# Patient Record
Sex: MALE | Race: WHITE | NOT HISPANIC OR LATINO
[De-identification: names, ages, dates, MRNs, and addresses within clinical notes are randomized per-mention and may not be internally consistent; named-entity substitution may affect disease eponyms.]

---

## 2018-02-13 ENCOUNTER — TRANSCRIPTION ENCOUNTER (OUTPATIENT)
Age: 63
End: 2018-02-13

## 2021-10-10 ENCOUNTER — TRANSCRIPTION ENCOUNTER (OUTPATIENT)
Age: 66
End: 2021-10-10

## 2021-10-25 ENCOUNTER — TRANSCRIPTION ENCOUNTER (OUTPATIENT)
Age: 66
End: 2021-10-25

## 2022-04-01 ENCOUNTER — TRANSCRIPTION ENCOUNTER (OUTPATIENT)
Age: 67
End: 2022-04-01

## 2022-06-09 ENCOUNTER — NON-APPOINTMENT (OUTPATIENT)
Age: 67
End: 2022-06-09

## 2022-06-23 ENCOUNTER — NON-APPOINTMENT (OUTPATIENT)
Age: 67
End: 2022-06-23

## 2023-03-16 ENCOUNTER — NON-APPOINTMENT (OUTPATIENT)
Age: 68
End: 2023-03-16

## 2023-04-12 ENCOUNTER — NON-APPOINTMENT (OUTPATIENT)
Age: 68
End: 2023-04-12

## 2024-03-19 ENCOUNTER — APPOINTMENT (OUTPATIENT)
Dept: UROLOGY | Facility: CLINIC | Age: 69
End: 2024-03-19
Payer: MEDICARE

## 2024-03-19 ENCOUNTER — OUTPATIENT (OUTPATIENT)
Dept: OUTPATIENT SERVICES | Facility: HOSPITAL | Age: 69
LOS: 1 days | End: 2024-03-19
Payer: MEDICARE

## 2024-03-19 VITALS
HEIGHT: 71 IN | HEART RATE: 71 BPM | TEMPERATURE: 97.2 F | DIASTOLIC BLOOD PRESSURE: 65 MMHG | BODY MASS INDEX: 24.5 KG/M2 | SYSTOLIC BLOOD PRESSURE: 162 MMHG | RESPIRATION RATE: 16 BRPM | WEIGHT: 175 LBS

## 2024-03-19 DIAGNOSIS — Z86.39 PERSONAL HISTORY OF OTHER ENDOCRINE, NUTRITIONAL AND METABOLIC DISEASE: ICD-10-CM

## 2024-03-19 DIAGNOSIS — Z86.79 PERSONAL HISTORY OF OTHER DISEASES OF THE CIRCULATORY SYSTEM: ICD-10-CM

## 2024-03-19 DIAGNOSIS — Z87.09 PERSONAL HISTORY OF OTHER DISEASES OF THE RESPIRATORY SYSTEM: ICD-10-CM

## 2024-03-19 DIAGNOSIS — R31.0 GROSS HEMATURIA: ICD-10-CM

## 2024-03-19 PROCEDURE — 52000 CYSTOURETHROSCOPY: CPT

## 2024-03-19 PROCEDURE — 99204 OFFICE O/P NEW MOD 45 MIN: CPT | Mod: 25

## 2024-03-19 NOTE — PHYSICAL EXAM
[Normal Appearance] : normal appearance [General Appearance - In No Acute Distress] : no acute distress [Edema] : no peripheral edema [] : no respiratory distress [Abdomen Soft] : soft [Abdomen Tenderness] : non-tender [Normal Station and Gait] : the gait and station were normal for the patient's age [No Focal Deficits] : no focal deficits [Skin Color & Pigmentation] : normal skin color and pigmentation [Oriented To Time, Place, And Person] : oriented to person, place, and time

## 2024-03-20 LAB
APPEARANCE: CLEAR
BACTERIA UR CULT: NORMAL
BACTERIA: NEGATIVE /HPF
BILIRUBIN URINE: NEGATIVE
BLOOD URINE: NEGATIVE
CAST: 0 /LPF
COLOR: YELLOW
EPITHELIAL CELLS: 0 /HPF
GLUCOSE QUALITATIVE U: 500 MG/DL
KETONES URINE: NEGATIVE MG/DL
LEUKOCYTE ESTERASE URINE: NEGATIVE
MICROSCOPIC-UA: NORMAL
NITRITE URINE: NEGATIVE
PH URINE: 6.5
PROTEIN URINE: NEGATIVE MG/DL
RED BLOOD CELLS URINE: 0 /HPF
SPECIFIC GRAVITY URINE: 1.01
UROBILINOGEN URINE: 0.2 MG/DL
WHITE BLOOD CELLS URINE: 0 /HPF

## 2024-03-20 NOTE — HISTORY OF PRESENT ILLNESS
[Urinary Frequency] : urinary frequency [Nocturia] : nocturia [Hematuria - Gross] : gross hematuria [None] : None [FreeTextEntry1] : Pt is a 67 y/o M with PMH of T2DM (HbA1c ~10 in 12/23), COPD, CAD s/p angioplasty and stents, 60 pack-year smoking hx (quit in 1995), presenting for evaluation of bladder tumor.  Pt was hospitalized in January while on vacation in [place] following an episode of syncope. Padilla catheter was placed in the hospital and subsequently had gross hematuria x4d.  US showed 1.2 cm vascularized polyp in the left bladder wall.  Denies hx of gross or microscopic hematuria prior to recent hospitalization. No active hematuria. Endorses urinary frequency and nocturia x2 every night.  no prior bladder tumors   Cystoscopy today (3/19/24): trabeculated bladder, left bladder wall tumor  called his cardiologist - at balloon angioplasty with drug eluting balloon - not approved in [place]. unclear when safe donna stop AC - 3 or 6 months  [Urinary Incontinence] : no urinary incontinence [Abdominal Pain] : no abdominal pain [Flank Pain] : no flank pain

## 2024-03-21 DIAGNOSIS — R35.0 FREQUENCY OF MICTURITION: ICD-10-CM

## 2024-03-21 DIAGNOSIS — D49.4 NEOPLASM OF UNSPECIFIED BEHAVIOR OF BLADDER: ICD-10-CM

## 2024-03-21 DIAGNOSIS — R31.0 GROSS HEMATURIA: ICD-10-CM

## 2024-03-21 LAB — URINE CYTOLOGY: NORMAL

## 2024-03-25 ENCOUNTER — APPOINTMENT (OUTPATIENT)
Dept: ENDOCRINOLOGY | Facility: CLINIC | Age: 69
End: 2024-03-25
Payer: MEDICARE

## 2024-03-25 VITALS
BODY MASS INDEX: 24.36 KG/M2 | WEIGHT: 174 LBS | HEIGHT: 71 IN | HEART RATE: 69 BPM | DIASTOLIC BLOOD PRESSURE: 74 MMHG | SYSTOLIC BLOOD PRESSURE: 146 MMHG | OXYGEN SATURATION: 98 %

## 2024-03-25 DIAGNOSIS — Z87.891 PERSONAL HISTORY OF NICOTINE DEPENDENCE: ICD-10-CM

## 2024-03-25 DIAGNOSIS — Z83.3 FAMILY HISTORY OF DIABETES MELLITUS: ICD-10-CM

## 2024-03-25 DIAGNOSIS — J44.9 CHRONIC OBSTRUCTIVE PULMONARY DISEASE, UNSPECIFIED: ICD-10-CM

## 2024-03-25 LAB
GLUCOSE BLDC GLUCOMTR-MCNC: 225
HBA1C MFR BLD HPLC: 8.8

## 2024-03-25 PROCEDURE — 83036 HEMOGLOBIN GLYCOSYLATED A1C: CPT | Mod: QW

## 2024-03-25 PROCEDURE — 82962 GLUCOSE BLOOD TEST: CPT

## 2024-03-25 PROCEDURE — 99204 OFFICE O/P NEW MOD 45 MIN: CPT

## 2024-03-25 RX ORDER — ENALAPRIL MALEATE 5 MG/1
5 TABLET ORAL DAILY
Qty: 90 | Refills: 0 | Status: ACTIVE | COMMUNITY
Start: 2024-03-25

## 2024-03-25 RX ORDER — SITAGLIPTIN 100 MG/1
100 TABLET, FILM COATED ORAL DAILY
Qty: 1 | Refills: 3 | Status: ACTIVE | COMMUNITY
Start: 2024-03-25

## 2024-03-25 RX ORDER — ATORVASTATIN CALCIUM 40 MG/1
40 TABLET, FILM COATED ORAL
Qty: 1 | Refills: 3 | Status: ACTIVE | COMMUNITY
Start: 2024-03-25

## 2024-03-25 RX ORDER — EZETIMIBE 10 MG/1
10 TABLET ORAL DAILY
Qty: 30 | Refills: 3 | Status: ACTIVE | COMMUNITY
Start: 2024-03-25

## 2024-03-25 NOTE — ASSESSMENT
[FreeTextEntry1] : The patient is a 68 year old male being seen in the office today for evaluation of diabetes. Past medical hx of T2DM (HbA1c ~10 in 12/23), HTN, CAD s/p angioplasty and stents, COPD, 60 pack-year smoking hx (quit in 1995), and bladder tumor. Admitted to hospital in [place] 1/2024 for syncope, had angiogram performed, s/p balloon angioplasty in [place]. Now following cardiology here, Dr. Keith. Recently found to have bladder tumor, following urology (Dr. Salomon). Pending possible TURBT.   T2DM - A1c 8.8% today 325/2024 - Current regimen: Metformin 850mg BID and Januvia 100mg daily - Plan: will obtain labs today, if GFR stable will recommend increasing Metformin to 1000mg BID. Discussed possibly switching Januvia to GLP1, patient would like to avoid injectables at this time. Can continue Januvia 100mg daily, no hx of pancreatitis. Following urology for bladder tumor, pending possible TURBT 4/2024, reports some polyuria, discussed possibly starting SGLT2i post urological procedure. Discussed risks vs benefits. No hx of DKA. No hx of frequent UTI's. Patient to check BG more frequently recommend to check 2-3 times a day staggered manner. Alternatively, can consider starting Prandin if hyperglycemia is primarily post prandial. Recommend professional CGM, patient going on vacation next week, will come back post vacation for professional winston placement with RN. Will make 4-6 week follow up appointment to review. - Labs: will obtain CMP, urine ACR, Vitamin B12 - Preventive:   Retinopathy screening: + b/l retinopathy, following optho regularly   Foot exam/podiatrist: recommend podiatry evaluation  - Counseling: We discussed diabetes foot care, long term complications of diabetes including but not limited to neuropathy, nephropathy, retinopathy and cardiovascular disease and the benefits of good glycemic control in preventing said complications. We discussed the risks and benefits of diabetes medications and/or insulin as relevant for today, prevention and management of hypoglycemia, importance of medication compliance and blood glucose monitoring. - Discussed diabetic diet, decreased intake of simple/processed sugars, increase intake of whole foods with protein and fiber. Increase physical activity as tolerated with cardiovascular exercise 150 min/per week consistently and resistance exercise three days per week.   HLD:  - Atorvastatin 40mg daily - Ezetimibe 10mg daily - Following cardiologist Dr. Keith - Check lipid profile   HTN: - Metoprolol 50mg daily - Enalapril 5mg a day - BP elevated today 146/74, repeat at next visit, following cardiology, recently had holter monitor and stress test - Recommend follow up with cardiology   4 week follow up with me Recommend CDE evaluation Has appointment with Dr. Graham in July

## 2024-03-25 NOTE — HISTORY OF PRESENT ILLNESS
[FreeTextEntry1] : The patient is a 68 year old male being seen in the office today for evaluation of diabetes. Past medical hx of T2DM (HbA1c ~10 in 12/23), HTN, CAD s/p angioplasty and stents, COPD, 60 pack-year smoking hx (quit in 1995), and bladder tumor. Admitted to hospital in [place] 1/2024 for syncope, had angiogram performed, s/p balloon angioplasty in [place]. Now following cardiology here, Dr. Keith. Recently found to have bladder tumor, following urology (Dr. Salomon). Pending possible TURBT.   REFERRED BY: Dr. George Lombardi, PCP. Plans to switch to new PCP in September: Dr. Causey   DIABETES HPI: Type of Diabetes: T2DM  Duration of Diabetes: dx in 2003 A1c: reports HbA1c 10% in 12/23 Today 8.8%  Current home regimen: Metformin 850mg BID and Januvia 100mg daily, restarted 2/2024 (was off of DM medications for about 1 month after discharge from hospital in [place], restarted by PCP) Past medications for diabetes: denies  Diabetes complications: Microvascular: [+] neuropathy, b/l feet with numbness, [-] nephropathy, [+] retinopathy Macrovascular: [+] CAD, [-] CVA, [-] PAD History of DKA/hospitalizations due to Diabetes: denies  Last retinopathy screening: last seen by optho 1 month ago, hx of retinopathy, s/p retina surgery for hemorrhage 2/2022, currently getting laser treatment for retinopathy in b/l eyes Last nephropathy screening: no urine ACR on file Last foot exam/podiatry visit: reports dry skin on b/l feet, does not see podiatry currently   BG self monitoring: checking fingersticks 1x a day, BG ranging from 130-200's, lowest 's Hypoglycemia events: denies  Diet: 3 meals a day, eating healthier, reduced processed food intake, eating more salads and veggies Breakfast: sausage and eggs, keto bread and butter. Cranberry juice Lunch: Hamburger jimbo or sandwich, tuna fish or turkey Dinner: meat, occasional potatoes and pasta, Friday's will have pizza Snack: sugar free cookies, sugar free jello with sugar Drinks diet soda and water  Exercise: walks daily Steroid intake: no PO steroids, daily steroid inhalers for COPD  Family history of diabetes: brother and maternal uncle with diabetes Social history: retired, lives with wife   Chronic blurry vision from cataract and retinopathy, denies polydypsia Reports some polyuria, seeing urologist for bladder tumor   Comorbidities: HTN, HLD  HLD:  Atorvastatin 40mg daily Ezetimibe 10mg daily - Following cardiologist Dr. Keith  HTN: Metoprolol 50mg daily Enalapril 5mg a day  PCP: Dr. Lombardi phone: [phone] fax: [phone] Cardiologist: Dr. Keith phone: [phone] fax: [phone]

## 2024-03-25 NOTE — REVIEW OF SYSTEMS
[Blurred Vision] : blurred vision [Polyuria] : polyuria [As Noted in HPI] : as noted in HPI [Pain/Numbness of Digits] : pain/numbness of digits [Negative] : Constitutional [Eye Pain] : no pain [Dysphagia] : no dysphagia [Neck Pain] : no neck pain [Dysphonia] : no dysphonia [Chest Pain] : no chest pain [Palpitations] : no palpitations [Shortness Of Breath] : no shortness of breath [Abdominal Pain] : no abdominal pain [Polydipsia] : no polydipsia

## 2024-03-25 NOTE — PHYSICAL EXAM
[Alert] : alert [No Acute Distress] : no acute distress [Well Developed] : well developed [Normal Sclera/Conjunctiva] : normal sclera/conjunctiva [No Proptosis] : no proptosis [No Respiratory Distress] : no respiratory distress [No Accessory Muscle Use] : no accessory muscle use [Normal Rate and Effort] : normal respiratory rate and effort [Normal Gait] : normal gait [No Involuntary Movements] : no involuntary movements were seen [Oriented x3] : oriented to person, place, and time [Normal Affect] : the affect was normal [Swelling] : not swollen [Tenderness] : not tender [Erythema] : not erythematous [#1 Diminished] : number 1 was normal [#2 Diminished] : number 2 was normal [#3 Diminished] : number 3 was normal [de-identified] : b/l feet dry, + callus

## 2024-03-27 LAB
ALBUMIN SERPL ELPH-MCNC: 4.5 G/DL
ALP BLD-CCNC: 84 U/L
ALT SERPL-CCNC: 31 U/L
ANION GAP SERPL CALC-SCNC: 12 MMOL/L
AST SERPL-CCNC: 25 U/L
BILIRUB SERPL-MCNC: 0.8 MG/DL
BUN SERPL-MCNC: 19 MG/DL
C PEPTIDE SERPL-MCNC: 1.3 NG/ML
CALCIUM SERPL-MCNC: 10.1 MG/DL
CHLORIDE SERPL-SCNC: 105 MMOL/L
CHOLEST SERPL-MCNC: 97 MG/DL
CO2 SERPL-SCNC: 26 MMOL/L
CREAT SERPL-MCNC: 1.01 MG/DL
CREAT SPEC-SCNC: 45 MG/DL
EGFR: 80 ML/MIN/1.73M2
GLUCOSE SERPL-MCNC: 239 MG/DL
HDLC SERPL-MCNC: 47 MG/DL
LDLC SERPL CALC-MCNC: 37 MG/DL
MICROALBUMIN 24H UR DL<=1MG/L-MCNC: <1.2 MG/DL
MICROALBUMIN/CREAT 24H UR-RTO: NORMAL MG/G
NONHDLC SERPL-MCNC: 50 MG/DL
POTASSIUM SERPL-SCNC: 5 MMOL/L
PROT SERPL-MCNC: 7.1 G/DL
SODIUM SERPL-SCNC: 143 MMOL/L
TRIGL SERPL-MCNC: 56 MG/DL
VIT B12 SERPL-MCNC: 1352 PG/ML

## 2024-03-27 RX ORDER — ALCOHOL ANTISEPTIC PADS
70 PADS, MEDICATED (EA) TOPICAL
Qty: 1 | Refills: 2 | Status: ACTIVE | COMMUNITY
Start: 2024-03-25 | End: 1900-01-01

## 2024-03-27 RX ORDER — METFORMIN HYDROCHLORIDE 1000 MG/1
1000 TABLET, COATED ORAL
Qty: 180 | Refills: 0 | Status: ACTIVE | COMMUNITY
Start: 2024-03-25 | End: 1900-01-01

## 2024-03-27 RX ORDER — LANCETS 28 GAUGE
EACH MISCELLANEOUS
Qty: 1 | Refills: 3 | Status: ACTIVE | COMMUNITY
Start: 2024-03-25 | End: 1900-01-01

## 2024-04-12 ENCOUNTER — APPOINTMENT (OUTPATIENT)
Dept: ENDOCRINOLOGY | Facility: CLINIC | Age: 69
End: 2024-04-12
Payer: MEDICARE

## 2024-04-12 PROCEDURE — G0108 DIAB MANAGE TRN  PER INDIV: CPT

## 2024-04-15 ENCOUNTER — APPOINTMENT (OUTPATIENT)
Dept: UROLOGY | Facility: AMBULATORY SURGERY CENTER | Age: 69
End: 2024-04-15

## 2024-04-16 ENCOUNTER — OUTPATIENT (OUTPATIENT)
Dept: OUTPATIENT SERVICES | Facility: HOSPITAL | Age: 69
LOS: 1 days | End: 2024-04-16

## 2024-04-16 VITALS
DIASTOLIC BLOOD PRESSURE: 68 MMHG | HEART RATE: 70 BPM | SYSTOLIC BLOOD PRESSURE: 150 MMHG | HEIGHT: 71 IN | WEIGHT: 167.99 LBS | TEMPERATURE: 97 F | RESPIRATION RATE: 17 BRPM | OXYGEN SATURATION: 97 %

## 2024-04-16 DIAGNOSIS — Z98.890 OTHER SPECIFIED POSTPROCEDURAL STATES: Chronic | ICD-10-CM

## 2024-04-16 DIAGNOSIS — D49.4 NEOPLASM OF UNSPECIFIED BEHAVIOR OF BLADDER: ICD-10-CM

## 2024-04-16 DIAGNOSIS — Z98.49 CATARACT EXTRACTION STATUS, UNSPECIFIED EYE: Chronic | ICD-10-CM

## 2024-04-16 DIAGNOSIS — D41.4 NEOPLASM OF UNCERTAIN BEHAVIOR OF BLADDER: ICD-10-CM

## 2024-04-16 DIAGNOSIS — Z95.5 PRESENCE OF CORONARY ANGIOPLASTY IMPLANT AND GRAFT: Chronic | ICD-10-CM

## 2024-04-16 DIAGNOSIS — I25.10 ATHEROSCLEROTIC HEART DISEASE OF NATIVE CORONARY ARTERY WITHOUT ANGINA PECTORIS: Chronic | ICD-10-CM

## 2024-04-16 DIAGNOSIS — J44.9 CHRONIC OBSTRUCTIVE PULMONARY DISEASE, UNSPECIFIED: ICD-10-CM

## 2024-04-16 DIAGNOSIS — I25.10 ATHEROSCLEROTIC HEART DISEASE OF NATIVE CORONARY ARTERY WITHOUT ANGINA PECTORIS: ICD-10-CM

## 2024-04-16 DIAGNOSIS — E11.9 TYPE 2 DIABETES MELLITUS WITHOUT COMPLICATIONS: ICD-10-CM

## 2024-04-16 DIAGNOSIS — Z98.61 CORONARY ANGIOPLASTY STATUS: Chronic | ICD-10-CM

## 2024-04-16 LAB
A1C WITH ESTIMATED AVERAGE GLUCOSE RESULT: 8.9 % — HIGH (ref 4–5.6)
ANION GAP SERPL CALC-SCNC: 12 MMOL/L — SIGNIFICANT CHANGE UP (ref 7–14)
BUN SERPL-MCNC: 19 MG/DL — SIGNIFICANT CHANGE UP (ref 7–23)
CALCIUM SERPL-MCNC: 9.5 MG/DL — SIGNIFICANT CHANGE UP (ref 8.4–10.5)
CHLORIDE SERPL-SCNC: 102 MMOL/L — SIGNIFICANT CHANGE UP (ref 98–107)
CO2 SERPL-SCNC: 24 MMOL/L — SIGNIFICANT CHANGE UP (ref 22–31)
CREAT SERPL-MCNC: 0.87 MG/DL — SIGNIFICANT CHANGE UP (ref 0.5–1.3)
EGFR: 93 ML/MIN/1.73M2 — SIGNIFICANT CHANGE UP
ESTIMATED AVERAGE GLUCOSE: 209 — SIGNIFICANT CHANGE UP
GLUCOSE SERPL-MCNC: 281 MG/DL — HIGH (ref 70–99)
HCT VFR BLD CALC: 37.1 % — LOW (ref 39–50)
HGB BLD-MCNC: 12.4 G/DL — LOW (ref 13–17)
MCHC RBC-ENTMCNC: 30.2 PG — SIGNIFICANT CHANGE UP (ref 27–34)
MCHC RBC-ENTMCNC: 33.4 GM/DL — SIGNIFICANT CHANGE UP (ref 32–36)
MCV RBC AUTO: 90.3 FL — SIGNIFICANT CHANGE UP (ref 80–100)
NRBC # BLD: 0 /100 WBCS — SIGNIFICANT CHANGE UP (ref 0–0)
NRBC # FLD: 0 K/UL — SIGNIFICANT CHANGE UP (ref 0–0)
PLATELET # BLD AUTO: 198 K/UL — SIGNIFICANT CHANGE UP (ref 150–400)
POTASSIUM SERPL-MCNC: 4.8 MMOL/L — SIGNIFICANT CHANGE UP (ref 3.5–5.3)
POTASSIUM SERPL-SCNC: 4.8 MMOL/L — SIGNIFICANT CHANGE UP (ref 3.5–5.3)
RBC # BLD: 4.11 M/UL — LOW (ref 4.2–5.8)
RBC # FLD: 12.6 % — SIGNIFICANT CHANGE UP (ref 10.3–14.5)
SODIUM SERPL-SCNC: 138 MMOL/L — SIGNIFICANT CHANGE UP (ref 135–145)
WBC # BLD: 8.02 K/UL — SIGNIFICANT CHANGE UP (ref 3.8–10.5)
WBC # FLD AUTO: 8.02 K/UL — SIGNIFICANT CHANGE UP (ref 3.8–10.5)

## 2024-04-16 RX ORDER — SODIUM CHLORIDE 9 MG/ML
3 INJECTION INTRAMUSCULAR; INTRAVENOUS; SUBCUTANEOUS EVERY 8 HOURS
Refills: 0 | Status: DISCONTINUED | OUTPATIENT
Start: 2024-04-25 | End: 2024-05-09

## 2024-04-16 RX ORDER — SODIUM CHLORIDE 9 MG/ML
1000 INJECTION, SOLUTION INTRAVENOUS
Refills: 0 | Status: DISCONTINUED | OUTPATIENT
Start: 2024-04-25 | End: 2024-05-09

## 2024-04-16 NOTE — H&P PST ADULT - OTHER CARE PROVIDERS
Cardiologist Dr. Rodri Keith [phone]     Endocrinologist Dr. April Lu  Pulmonologist Dr. Hall [phone] Cardiologist Dr. Rodri Keith [phone]     Endocrinologist Dr. April Lu    Pulmonologist Dr. Hall [phone]

## 2024-04-16 NOTE — H&P PST ADULT - NSICDXPASTSURGICALHX_GEN_ALL_CORE_FT
PAST SURGICAL HISTORY:  CAD S/P percutaneous coronary angioplasty     H/O arthroscopy of knee     H/O colonoscopy     H/O cystoscopy     H/O eye surgery     History of artificial skin graft     History of vocal cord polypectomy     S/P cataract surgery     S/P coronary artery stent placement     S/P right rotator cuff repair     Status post Mohs surgery

## 2024-04-16 NOTE — H&P PST ADULT - NSICDXPASTMEDICALHX_GEN_ALL_CORE_FT
PAST MEDICAL HISTORY:  Basal cell carcinoma     CAD (coronary artery disease)     COPD (chronic obstructive pulmonary disease)     H/O squamous cell carcinoma excision     History of shingles     HLD (hyperlipidemia)     HTN (hypertension)     Lumbar herniated disc     Neoplasm of uncertain behavior of bladder     Nerve pain     Neuroma     Pulmonary nodule     Stented coronary artery     Syncope     Type 2 diabetes mellitus

## 2024-04-16 NOTE — H&P PST ADULT - HISTORY OF PRESENT ILLNESS
67 y/o male with HTN, HLD, Type 2 DM, COPD and CAD s/p angioplasty and stent currently on Brilinta presents to [facility] preop for cystoscopy, transurethral resection of bladder tumor. Pt was hospitalized in January 2024 while on vacation in [place] following an episode of syncope. Padilla catheter was placed in the hospital and subsequently had gross hematuria for 4 days. An US showed 1.2 cm bladder polyp. s/p cystoscopy on 3/19/24 which confirmed left bladder wall tumor.     Pt s/p balloon angioplasty on 1/20/24 while in [place]. h/o drug eluting coronary stent placement in August 2007.  69 y/o male with HTN, HLD, Type 2 DM, COPD and CAD s/p angioplasty and stent currently on Brilinta presents to [facility] preop for cystoscopy, transurethral resection of bladder tumor. Pt was hospitalized in January 2024 while on vacation in [place] following an episode of syncope. A mont[place]ro catheter was placed in the hospital and pt subsequently had gross hematuria for 4 days. An US showed 1.2 cm bladder polyp. s/p cystoscopy on 3/19/24 which confirmed left bladder wall tumor.     Pt s/p balloon angioplasty on 1/20/24 while in [place]. h/o drug eluting coronary stent placement in August 2007.

## 2024-04-16 NOTE — H&P PST ADULT - CARDIOVASCULAR COMMENTS
CAD, HTN, HLD. Pt hospitalized in January 2024 while on vacation in [place] following an episode of syncope. s/p balloon angioplasty on 1/20/24 while in [place]. h/o drug eluting coronary stent placement in August 2007. Pt seen by primary cardiologist upon [place]. He wore a holter monitor x14 days. Pt reports cardiac etiology for syncope episode was ruled out after results of monitor.

## 2024-04-16 NOTE — H&P PST ADULT - NSICDXFAMILYHX_GEN_ALL_CORE_FT
FAMILY HISTORY:  Father  Still living? Unknown  FH: lung cancer, Age at diagnosis: Age Unknown    Mother  Still living? Unknown  FH: stroke, Age at diagnosis: Age Unknown

## 2024-04-16 NOTE — H&P PST ADULT - PROBLEM SELECTOR PLAN 3
metformin to be held AM of surgery  accu check to be assessed on admission metformin to be held AM of surgery  accu check to be assessed on admission  hga1c pending

## 2024-04-16 NOTE — H&P PST ADULT - PROBLEM SELECTOR PLAN 1
preop for cystoscopy, transurethral resection of bladder tumor on 4/22/24  preop instructions given, pt verbalized understanding  GI prophylaxis provided  cbc, bmp, hga1c pending

## 2024-04-16 NOTE — H&P PST ADULT - ASSESSMENT
69 y/o male with HTN, HLD, Type 2 DM, COPD and CAD s/p angioplasty and stent currently on Brilinta presents to [facility] preop for cystoscopy, transurethral resection of bladder tumor. Pt was hospitalized in January 2024 while on vacation in [place] following an episode of syncope. Padilla catheter was placed in the hospital and subsequently had gross hematuria for 4 days. An US showed 1.2 cm bladder polyp. s/p cystoscopy on 3/19/24 which confirmed left bladder wall tumor.     Pt s/p balloon angioplasty on 1/20/24 while in [place]. h/o drug eluting coronary stent placement in August 2007.  69 y/o male with HTN, HLD, Type 2 DM, COPD and CAD s/p angioplasty and stent currently on Brilinta presents to [facility] preop for cystoscopy, transurethral resection of bladder tumor. Pt was hospitalized in January 2024 while on vacation in [place] following an episode of syncope. A montenegro catheter was placed in the hospital and pt subsequently had gross hematuria for 4 days. An US showed 1.2 cm bladder polyp. s/p cystoscopy on 3/19/24 which confirmed left bladder wall tumor.     Pt s/p balloon angioplasty on 1/20/24 while in [place]. h/o drug eluting coronary stent placement in August 2007.

## 2024-04-16 NOTE — H&P PST ADULT - EKG AND INTERPRETATION
per wife, EKG done 1/30/24 per wife, EKG done 1/30/24- copy of report requested 1/30/24 Sinus Tachycardia, incomplete right bundle branch block. HR 100bpm

## 2024-04-16 NOTE — H&P PST ADULT - ENDOCRINE COMMENTS
Type 2 DM on Metformin. Pt has been non compliant with diabetes meds. Recently had doses of meds increased and as per wife, pt is now compliant with diet and  medication regimen.  Avg fasting BS 178mg/dl. Type 2 DM on Metformin. Pt has been non compliant with diabetes meds. Recently had doses of meds increased and as per wife, pt is now compliant with diet and  medication regimen.  Avg fasting BS 178mg/dl.  Hga1c in March was 8.9%

## 2024-04-16 NOTE — H&P PST ADULT - NEGATIVE NEUROLOGICAL SYMPTOMS
no transient paralysis/no weakness/no generalized seizures/no focal seizures/no vertigo/no headache/no loss of consciousness/no hemiparesis/no confusion

## 2024-04-16 NOTE — H&P PST ADULT - NEGATIVE ENMT SYMPTOMS
no ear pain/no tinnitus/no vertigo/no sinus symptoms/no nose bleeds/no gum bleeding/no dry mouth/no throat pain/no dysphagia

## 2024-04-16 NOTE — H&P PST ADULT - RESPIRATORY AND THORAX COMMENTS
COPD on Spiriva and Advair. Pt uses rescue inhaler as needed. Denies COPD exacerbation. Followed by Pulmonary 2x/ year

## 2024-04-16 NOTE — H&P PST ADULT - PROBLEM SELECTOR PLAN 2
pt was instructed by his cardiologist to hold Brilinta 3-5 days prior to surgery  He will remain on low dose Aspirin due to coronary stent pt was instructed by his cardiologist to hold Brilinta 3-5 days prior to surgery  He will remain on low dose Aspirin due to coronary stent  current EKG and ECHO report requested from Cardiologist pt was instructed by his cardiologist to hold Brilinta 3-5 days prior to surgery  He will remain on low dose Aspirin due to coronary stent  ECHO report requested from Cardiologist

## 2024-04-17 PROBLEM — R91.1 SOLITARY PULMONARY NODULE: Chronic | Status: ACTIVE | Noted: 2024-04-16

## 2024-04-17 PROBLEM — E78.5 HYPERLIPIDEMIA, UNSPECIFIED: Chronic | Status: ACTIVE | Noted: 2024-04-16

## 2024-04-17 PROBLEM — D41.4 NEOPLASM OF UNCERTAIN BEHAVIOR OF BLADDER: Chronic | Status: ACTIVE | Noted: 2024-04-16

## 2024-04-17 PROBLEM — R55 SYNCOPE AND COLLAPSE: Chronic | Status: ACTIVE | Noted: 2024-04-16

## 2024-04-17 PROBLEM — Z86.19 PERSONAL HISTORY OF OTHER INFECTIOUS AND PARASITIC DISEASES: Chronic | Status: ACTIVE | Noted: 2024-04-16

## 2024-04-17 PROBLEM — Z98.890 OTHER SPECIFIED POSTPROCEDURAL STATES: Chronic | Status: ACTIVE | Noted: 2024-04-16

## 2024-04-17 PROBLEM — M79.2 NEURALGIA AND NEURITIS, UNSPECIFIED: Chronic | Status: ACTIVE | Noted: 2024-04-16

## 2024-04-17 PROBLEM — M51.26 OTHER INTERVERTEBRAL DISC DISPLACEMENT, LUMBAR REGION: Chronic | Status: ACTIVE | Noted: 2024-04-16

## 2024-04-17 PROBLEM — Z95.5 PRESENCE OF CORONARY ANGIOPLASTY IMPLANT AND GRAFT: Chronic | Status: ACTIVE | Noted: 2024-04-16

## 2024-04-17 PROBLEM — I25.10 ATHEROSCLEROTIC HEART DISEASE OF NATIVE CORONARY ARTERY WITHOUT ANGINA PECTORIS: Chronic | Status: ACTIVE | Noted: 2024-04-16

## 2024-04-17 PROBLEM — I10 ESSENTIAL (PRIMARY) HYPERTENSION: Chronic | Status: ACTIVE | Noted: 2024-04-16

## 2024-04-17 PROBLEM — E11.9 TYPE 2 DIABETES MELLITUS WITHOUT COMPLICATIONS: Chronic | Status: ACTIVE | Noted: 2024-04-16

## 2024-04-17 PROBLEM — C44.91 BASAL CELL CARCINOMA OF SKIN, UNSPECIFIED: Chronic | Status: ACTIVE | Noted: 2024-04-16

## 2024-04-17 PROBLEM — J44.9 CHRONIC OBSTRUCTIVE PULMONARY DISEASE, UNSPECIFIED: Chronic | Status: ACTIVE | Noted: 2024-04-16

## 2024-04-17 PROBLEM — D36.10 BENIGN NEOPLASM OF PERIPHERAL NERVES AND AUTONOMIC NERVOUS SYSTEM, UNSPECIFIED: Chronic | Status: ACTIVE | Noted: 2024-04-16

## 2024-04-17 LAB
CULTURE RESULTS: NO GROWTH — SIGNIFICANT CHANGE UP
SPECIMEN SOURCE: SIGNIFICANT CHANGE UP

## 2024-04-22 ENCOUNTER — APPOINTMENT (OUTPATIENT)
Dept: UROLOGY | Facility: AMBULATORY SURGERY CENTER | Age: 69
End: 2024-04-22

## 2024-04-24 ENCOUNTER — TRANSCRIPTION ENCOUNTER (OUTPATIENT)
Age: 69
End: 2024-04-24

## 2024-04-24 NOTE — ASU PATIENT PROFILE, ADULT - FALL HARM RISK - HARM RISK INTERVENTIONS

## 2024-04-25 ENCOUNTER — OUTPATIENT (OUTPATIENT)
Dept: OUTPATIENT SERVICES | Facility: HOSPITAL | Age: 69
LOS: 1 days | Discharge: HOME | End: 2024-04-25
Payer: MEDICARE

## 2024-04-25 ENCOUNTER — APPOINTMENT (OUTPATIENT)
Dept: UROLOGY | Facility: HOSPITAL | Age: 69
End: 2024-04-25

## 2024-04-25 ENCOUNTER — TRANSCRIPTION ENCOUNTER (OUTPATIENT)
Age: 69
End: 2024-04-25

## 2024-04-25 ENCOUNTER — RESULT REVIEW (OUTPATIENT)
Age: 69
End: 2024-04-25

## 2024-04-25 VITALS
RESPIRATION RATE: 18 BRPM | OXYGEN SATURATION: 96 % | HEART RATE: 64 BPM | WEIGHT: 167.99 LBS | HEIGHT: 71 IN | SYSTOLIC BLOOD PRESSURE: 142 MMHG | TEMPERATURE: 98 F | DIASTOLIC BLOOD PRESSURE: 98 MMHG

## 2024-04-25 VITALS
DIASTOLIC BLOOD PRESSURE: 72 MMHG | RESPIRATION RATE: 12 BRPM | HEART RATE: 70 BPM | OXYGEN SATURATION: 97 % | SYSTOLIC BLOOD PRESSURE: 139 MMHG

## 2024-04-25 DIAGNOSIS — I25.10 ATHEROSCLEROTIC HEART DISEASE OF NATIVE CORONARY ARTERY WITHOUT ANGINA PECTORIS: Chronic | ICD-10-CM

## 2024-04-25 DIAGNOSIS — Z98.890 OTHER SPECIFIED POSTPROCEDURAL STATES: Chronic | ICD-10-CM

## 2024-04-25 DIAGNOSIS — D49.4 NEOPLASM OF UNSPECIFIED BEHAVIOR OF BLADDER: ICD-10-CM

## 2024-04-25 DIAGNOSIS — Z95.5 PRESENCE OF CORONARY ANGIOPLASTY IMPLANT AND GRAFT: Chronic | ICD-10-CM

## 2024-04-25 DIAGNOSIS — Z98.49 CATARACT EXTRACTION STATUS, UNSPECIFIED EYE: Chronic | ICD-10-CM

## 2024-04-25 LAB — GLUCOSE BLDC GLUCOMTR-MCNC: 191 MG/DL — HIGH (ref 70–99)

## 2024-04-25 PROCEDURE — 88307 TISSUE EXAM BY PATHOLOGIST: CPT | Mod: 26

## 2024-04-25 PROCEDURE — 52235 CYSTOSCOPY AND TREATMENT: CPT

## 2024-04-25 RX ORDER — ONDANSETRON 8 MG/1
4 TABLET, FILM COATED ORAL ONCE
Refills: 0 | Status: DISCONTINUED | OUTPATIENT
Start: 2024-04-25 | End: 2024-05-09

## 2024-04-25 RX ORDER — ALBUTEROL 90 UG/1
2 AEROSOL, METERED ORAL
Refills: 0 | DISCHARGE

## 2024-04-25 RX ORDER — ACETAMINOPHEN 500 MG
1000 TABLET ORAL ONCE
Refills: 0 | Status: COMPLETED | OUTPATIENT
Start: 2024-04-25 | End: 2024-04-25

## 2024-04-25 RX ORDER — HYDROMORPHONE HYDROCHLORIDE 2 MG/ML
0.5 INJECTION INTRAMUSCULAR; INTRAVENOUS; SUBCUTANEOUS
Refills: 0 | Status: DISCONTINUED | OUTPATIENT
Start: 2024-04-25 | End: 2024-04-25

## 2024-04-25 RX ORDER — METFORMIN HYDROCHLORIDE 850 MG/1
1 TABLET ORAL
Refills: 0 | DISCHARGE

## 2024-04-25 RX ORDER — GEMCITABINE 38 MG/ML
2000 INJECTION, SOLUTION INTRAVENOUS ONCE
Refills: 0 | Status: DISCONTINUED | OUTPATIENT
Start: 2024-04-25 | End: 2024-05-09

## 2024-04-25 RX ORDER — OXYCODONE AND ACETAMINOPHEN 5; 325 MG/1; MG/1
1 TABLET ORAL
Refills: 0 | DISCHARGE

## 2024-04-25 RX ORDER — SITAGLIPTIN 50 MG/1
1 TABLET, FILM COATED ORAL
Refills: 0 | DISCHARGE

## 2024-04-25 RX ORDER — TIOTROPIUM BROMIDE 18 UG/1
1 CAPSULE ORAL; RESPIRATORY (INHALATION)
Refills: 0 | DISCHARGE

## 2024-04-25 RX ORDER — METOPROLOL TARTRATE 50 MG
1 TABLET ORAL
Refills: 0 | DISCHARGE

## 2024-04-25 RX ORDER — EZETIMIBE 10 MG/1
1 TABLET ORAL
Refills: 0 | DISCHARGE

## 2024-04-25 RX ORDER — FENTANYL CITRATE 50 UG/ML
25 INJECTION INTRAVENOUS
Refills: 0 | Status: DISCONTINUED | OUTPATIENT
Start: 2024-04-25 | End: 2024-04-25

## 2024-04-25 RX ORDER — SODIUM CHLORIDE 9 MG/ML
1000 INJECTION, SOLUTION INTRAVENOUS
Refills: 0 | Status: DISCONTINUED | OUTPATIENT
Start: 2024-04-25 | End: 2024-05-09

## 2024-04-25 RX ORDER — ASPIRIN/CALCIUM CARB/MAGNESIUM 324 MG
0 TABLET ORAL
Refills: 0 | DISCHARGE

## 2024-04-25 RX ORDER — OXYCODONE HYDROCHLORIDE 5 MG/1
5 TABLET ORAL ONCE
Refills: 0 | Status: DISCONTINUED | OUTPATIENT
Start: 2024-04-25 | End: 2024-04-25

## 2024-04-25 RX ORDER — CEPHALEXIN 500 MG
1 CAPSULE ORAL
Qty: 6 | Refills: 0
Start: 2024-04-25 | End: 2024-04-27

## 2024-04-25 RX ORDER — ATORVASTATIN CALCIUM 80 MG/1
1 TABLET, FILM COATED ORAL
Refills: 0 | DISCHARGE

## 2024-04-25 RX ORDER — FLUTICASONE PROPIONATE AND SALMETEROL 50; 250 UG/1; UG/1
1 POWDER ORAL; RESPIRATORY (INHALATION)
Refills: 0 | DISCHARGE

## 2024-04-25 RX ADMIN — SODIUM CHLORIDE 3 MILLILITER(S): 9 INJECTION INTRAMUSCULAR; INTRAVENOUS; SUBCUTANEOUS at 13:14

## 2024-04-25 RX ADMIN — Medication 400 MILLIGRAM(S): at 14:41

## 2024-04-25 RX ADMIN — OXYCODONE HYDROCHLORIDE 5 MILLIGRAM(S): 5 TABLET ORAL at 17:59

## 2024-04-25 RX ADMIN — HYDROMORPHONE HYDROCHLORIDE 0.5 MILLIGRAM(S): 2 INJECTION INTRAMUSCULAR; INTRAVENOUS; SUBCUTANEOUS at 13:10

## 2024-04-25 RX ADMIN — FENTANYL CITRATE 25 MICROGRAM(S): 50 INJECTION INTRAVENOUS at 12:26

## 2024-04-25 RX ADMIN — HYDROMORPHONE HYDROCHLORIDE 0.5 MILLIGRAM(S): 2 INJECTION INTRAMUSCULAR; INTRAVENOUS; SUBCUTANEOUS at 12:48

## 2024-04-25 RX ADMIN — SODIUM CHLORIDE 125 MILLILITER(S): 9 INJECTION, SOLUTION INTRAVENOUS at 12:26

## 2024-04-25 RX ADMIN — FENTANYL CITRATE 25 MICROGRAM(S): 50 INJECTION INTRAVENOUS at 12:45

## 2024-04-25 RX ADMIN — Medication 1000 MILLIGRAM(S): at 15:05

## 2024-04-25 RX ADMIN — OXYCODONE HYDROCHLORIDE 5 MILLIGRAM(S): 5 TABLET ORAL at 18:25

## 2024-04-25 NOTE — BRIEF OPERATIVE NOTE - NSICDXBRIEFPROCEDURE_GEN_ALL_CORE_FT
PROCEDURES:  Transurethral resection of bladder tumor (TURBT) with biopsy 25-Apr-2024 12:09:20  Brando Baca

## 2024-04-25 NOTE — ASU PREOP CHECKLIST - BP NONINVASIVE SYSTOLIC (MM HG)
I spoke to patient and told her yes Dr Millie Murphy refers to Dr Zaira Henley, patient is scheduled to see him this afternoon. 142

## 2024-04-25 NOTE — ASU DISCHARGE PLAN (ADULT/PEDIATRIC) - CARE PROVIDER_API CALL
Leonides Salomon  Urology  [address] [place] [address], [address] [address]  Phone: [phone]  Fax: [phone]  Follow Up Time:

## 2024-04-25 NOTE — ASU DISCHARGE PLAN (ADULT/PEDIATRIC) - NURSING INSTRUCTIONS
You were given 1000mg IV Tylenol for pain management.  Please DO NOT take any Tylenol containing products, such as  Vicodin, Percocet, Excedrin, many cold preparations for the next 6 hours (until  _8:45__ PM).  DO NOT EXCEED 4000MG OF TYLENOL OVER 24 HOURS.

## 2024-04-25 NOTE — ASU DISCHARGE PLAN (ADULT/PEDIATRIC) - ASU DC SPECIAL INSTRUCTIONSFT
It is common to have blood in the urine after your procedure.  It may be pink or even red; inform your doctor if you have a significant amount of clots in the urine or if you are unable to void at all.  Be sure to drink plenty of liquids at all times.    -You may resume your regular diet and regular medication regimen.    -Provided that you are not restricted with fluids by your physician, you should drink 6-8 (8 oz.) glasses of fluid per day.  -You may shower or bathe.    -Provided you are not restricted by your physician, you may take Tylenol and Ibuprofen, available over the counter, for pain. You may take either one every 6 hours, you may alternate these medications so that you take one every 3 hours (e.g., Tylenol at 12pm, Ibuprofen at 3pm, Tylenol at 6pm, Ibuprofen at 9pm, etc...). Follow the maximum doses and administration instructions on the bottles. Do not exceed 4 grams of Tylenol daily. If your pain is not controlled with over the counter pain medications, please call your urologist.  -You will likely have a prescription for an antibiotic when you go home.  Take this medication as instructed; if you miss one dose, resume taking it on your previous schedule until you have completed the entire course of treatment.  -Do not perform strenuous activities or resume sexual activity until your are told to do so by your doctor.   You may climb stairs.  -Call your physician if you have a fever over 101F.  -Make a follow up appointment with your urologist when you arrive home (or the next business day).  -Call your urologist during normal business hours with any other routine questions.  -Dr. Salomon will call with biopsy results within 7-10 days It is common to have blood in the urine after your procedure.  It may be pink or even red; inform your doctor if you have a significant amount of clots in the urine or if you are unable to void at all.  Be sure to drink plenty of liquids at all times.    -You may resume your regular diet and regular medication regimen.    -Provided that you are not restricted with fluids by your physician, you should drink 6-8 (8 oz.) glasses of fluid per day.  -You may shower or bathe.    -Provided you are not restricted by your physician, you may take Tylenol and Ibuprofen, available over the counter, for pain. You may take either one every 6 hours, you may alternate these medications so that you take one every 3 hours (e.g., Tylenol at 12pm, Ibuprofen at 3pm, Tylenol at 6pm, Ibuprofen at 9pm, etc...). Follow the maximum doses and administration instructions on the bottles. Do not exceed 4 grams of Tylenol daily. If your pain is not controlled with over the counter pain medications, please call your urologist.  -You will likely have a prescription for an antibiotic when you go home.  Take this medication as instructed; if you miss one dose, resume taking it on your previous schedule until you have completed the entire course of treatment.  -Do not perform strenuous activities or resume sexual activity until your are told to do so by your doctor.   You may climb stairs.  -Call your physician if you have a fever over 101F.  -Make a follow up appointment with your urologist when you arrive home (or the next business day).  -Call your urologist during normal business hours with any other routine questions.  -Dr. Salomon will call with biopsy results within 7-10 days    PLEASE HOLD BRILLINTA FOR 5 DAYS AND RESTART ON TUESDAY 4/30 Terbinafine Pregnancy And Lactation Text: This medication is Pregnancy Category B and is considered safe during pregnancy. It is also excreted in breast milk and breast feeding isn't recommended.

## 2024-04-25 NOTE — BRIEF OPERATIVE NOTE - OPERATION/FINDINGS
Transurethral resection of bladder tumor. 18Fr montenegro placement for gemcitabine instillation. Plan for TOV prior to discharge.

## 2024-04-25 NOTE — ASU DISCHARGE PLAN (ADULT/PEDIATRIC) - FOLLOW UP APPOINTMENTS
May also call Recovery Room (PACU) 24/7 @ [phone]/[facility]

## 2024-04-29 ENCOUNTER — APPOINTMENT (OUTPATIENT)
Dept: ENDOCRINOLOGY | Facility: CLINIC | Age: 69
End: 2024-04-29

## 2024-04-30 ENCOUNTER — OUTPATIENT (OUTPATIENT)
Dept: OUTPATIENT SERVICES | Facility: HOSPITAL | Age: 69
LOS: 1 days | End: 2024-04-30
Payer: MEDICARE

## 2024-04-30 ENCOUNTER — APPOINTMENT (OUTPATIENT)
Dept: UROLOGY | Facility: CLINIC | Age: 69
End: 2024-04-30
Payer: MEDICARE

## 2024-04-30 VITALS — HEART RATE: 73 BPM | DIASTOLIC BLOOD PRESSURE: 63 MMHG | SYSTOLIC BLOOD PRESSURE: 138 MMHG

## 2024-04-30 DIAGNOSIS — Z98.890 OTHER SPECIFIED POSTPROCEDURAL STATES: Chronic | ICD-10-CM

## 2024-04-30 DIAGNOSIS — D49.4 NEOPLASM OF UNSPECIFIED BEHAVIOR OF BLADDER: ICD-10-CM

## 2024-04-30 DIAGNOSIS — I25.10 ATHEROSCLEROTIC HEART DISEASE OF NATIVE CORONARY ARTERY WITHOUT ANGINA PECTORIS: Chronic | ICD-10-CM

## 2024-04-30 DIAGNOSIS — R35.0 FREQUENCY OF MICTURITION: ICD-10-CM

## 2024-04-30 DIAGNOSIS — Z98.49 CATARACT EXTRACTION STATUS, UNSPECIFIED EYE: Chronic | ICD-10-CM

## 2024-04-30 DIAGNOSIS — Z95.5 PRESENCE OF CORONARY ANGIOPLASTY IMPLANT AND GRAFT: Chronic | ICD-10-CM

## 2024-04-30 PROCEDURE — 51702 INSERT TEMP BLADDER CATH: CPT

## 2024-04-30 RX ORDER — TICAGRELOR 90 MG/1
1 TABLET ORAL
Qty: 0 | Refills: 0 | DISCHARGE
Start: 2024-04-30

## 2024-05-01 ENCOUNTER — NON-APPOINTMENT (OUTPATIENT)
Age: 69
End: 2024-05-01

## 2024-05-01 PROBLEM — D49.4 BLADDER TUMOR: Status: ACTIVE | Noted: 2024-03-20

## 2024-05-02 ENCOUNTER — TRANSCRIPTION ENCOUNTER (OUTPATIENT)
Age: 69
End: 2024-05-02

## 2024-05-02 DIAGNOSIS — D49.4 NEOPLASM OF UNSPECIFIED BEHAVIOR OF BLADDER: ICD-10-CM

## 2024-05-02 LAB — SURGICAL PATHOLOGY STUDY: SIGNIFICANT CHANGE UP

## 2024-05-06 ENCOUNTER — APPOINTMENT (OUTPATIENT)
Dept: ENDOCRINOLOGY | Facility: CLINIC | Age: 69
End: 2024-05-06

## 2024-05-10 ENCOUNTER — OUTPATIENT (OUTPATIENT)
Dept: OUTPATIENT SERVICES | Facility: HOSPITAL | Age: 69
LOS: 1 days | End: 2024-05-10
Payer: MEDICARE

## 2024-05-10 ENCOUNTER — APPOINTMENT (OUTPATIENT)
Dept: UROLOGY | Facility: CLINIC | Age: 69
End: 2024-05-10
Payer: MEDICARE

## 2024-05-10 VITALS — OXYGEN SATURATION: 96 % | HEART RATE: 74 BPM | DIASTOLIC BLOOD PRESSURE: 72 MMHG | SYSTOLIC BLOOD PRESSURE: 173 MMHG

## 2024-05-10 DIAGNOSIS — Z98.890 OTHER SPECIFIED POSTPROCEDURAL STATES: Chronic | ICD-10-CM

## 2024-05-10 DIAGNOSIS — I25.10 ATHEROSCLEROTIC HEART DISEASE OF NATIVE CORONARY ARTERY WITHOUT ANGINA PECTORIS: Chronic | ICD-10-CM

## 2024-05-10 DIAGNOSIS — Z98.49 CATARACT EXTRACTION STATUS, UNSPECIFIED EYE: Chronic | ICD-10-CM

## 2024-05-10 DIAGNOSIS — R35.0 FREQUENCY OF MICTURITION: ICD-10-CM

## 2024-05-10 PROCEDURE — 51700 IRRIGATION OF BLADDER: CPT

## 2024-05-10 PROCEDURE — 51798 US URINE CAPACITY MEASURE: CPT

## 2024-05-17 DIAGNOSIS — R33.9 RETENTION OF URINE, UNSPECIFIED: ICD-10-CM

## 2024-05-22 ENCOUNTER — APPOINTMENT (OUTPATIENT)
Dept: ENDOCRINOLOGY | Facility: CLINIC | Age: 69
End: 2024-05-22
Payer: MEDICARE

## 2024-05-22 VITALS
WEIGHT: 158 LBS | OXYGEN SATURATION: 96 % | DIASTOLIC BLOOD PRESSURE: 62 MMHG | SYSTOLIC BLOOD PRESSURE: 130 MMHG | HEART RATE: 74 BPM | BODY MASS INDEX: 22.12 KG/M2 | HEIGHT: 71 IN

## 2024-05-22 DIAGNOSIS — I10 ESSENTIAL (PRIMARY) HYPERTENSION: ICD-10-CM

## 2024-05-22 DIAGNOSIS — H35.00 UNSPECIFIED BACKGROUND RETINOPATHY: ICD-10-CM

## 2024-05-22 DIAGNOSIS — E11.9 TYPE 2 DIABETES MELLITUS W/OUT COMPLICATIONS: ICD-10-CM

## 2024-05-22 DIAGNOSIS — E78.5 HYPERLIPIDEMIA, UNSPECIFIED: ICD-10-CM

## 2024-05-22 PROCEDURE — 99214 OFFICE O/P EST MOD 30 MIN: CPT

## 2024-05-24 RX ORDER — SULFAMETHOXAZOLE AND TRIMETHOPRIM 800; 160 MG/1; MG/1
800-160 TABLET ORAL TWICE DAILY
Qty: 14 | Refills: 0 | Status: ACTIVE | COMMUNITY
Start: 2024-05-11 | End: 1900-01-01

## 2024-05-29 ENCOUNTER — APPOINTMENT (OUTPATIENT)
Dept: UROLOGY | Facility: IMAGING CENTER | Age: 69
End: 2024-05-29

## 2024-05-29 PROBLEM — E11.9 DIABETES MELLITUS, TYPE 2: Status: ACTIVE | Noted: 2024-03-25

## 2024-05-29 PROBLEM — H35.00 RETINOPATHY, BILATERAL: Status: ACTIVE | Noted: 2024-03-25

## 2024-05-29 PROBLEM — E78.5 HLD (HYPERLIPIDEMIA): Status: ACTIVE | Noted: 2024-03-25

## 2024-05-29 PROBLEM — I10 HTN (HYPERTENSION): Status: ACTIVE | Noted: 2024-03-25

## 2024-05-30 ENCOUNTER — TRANSCRIPTION ENCOUNTER (OUTPATIENT)
Age: 69
End: 2024-05-30

## 2024-06-04 ENCOUNTER — NON-APPOINTMENT (OUTPATIENT)
Age: 69
End: 2024-06-04

## 2024-06-05 NOTE — HISTORY OF PRESENT ILLNESS
[FreeTextEntry1] : The patient is a 69 year old male being seen in the office today for follow up for diabetes. Last seen in office 3/2024. Past medical hx of T2DM (HbA1c ~10 in ), HTN, CAD s/p angioplasty and stents, COPD, 60 pack-year smoking hx (quit in ), and bladder tumor. Admitted to hospital in [place] 2024 for syncope, had angiogram performed, s/p balloon angioplasty in [place]. Now following cardiology here, Dr. Keith. Recently found to have bladder tumor, following urology (Dr. Salomon). S/p TURBT, now doing intermittent urinary catheterization, recent UTI, completed antibiotics.   T2DM dx in  A1c: reports HbA1c 10% in , 3/2024 8.8%  Current home regimen: Metformin 1000mg BID (increased at last visit) Januvia 100mg daily Past medications for diabetes: denies  Diabetes complications: Microvascular: [+] neuropathy, b/l feet with numbness, [-] nephropathy, [+] retinopathy Macrovascular: [+] CAD, [-] CVA, [-] PAD History of DKA/hospitalizations due to Diabetes: denies  Last retinopathy screening: last seen by optho 1 month ago, hx of retinopathy, s/p retina surgery for hemorrhage 2022, currently getting laser treatment for retinopathy in b/l eyes Last nephropathy screening: 3/2024 urine ACR wnl Last foot exam/podiatry visit: reports dry skin on b/l feet, does not see podiatry currently   BG self monitoring: checking fingersticks 3-4x a day AM fastin, 160, 157, 172, 160, 187, 112, 143, 196, 187, 170, 153 2 hours after breakfast: 280, 189, 270, 124, 286, 252, 314, 208 Before lunch: 190, 87, 274, 142 2 hours after lunch: 201, 176, 214, 166, 221, 255, 277 Before dinner: 137 2 hours after dinner: 171, 199, 112, 120, 203, 215, 180, 235, 141, 240, 152, 222, 173, 221, 281, 150 Hypoglycemia events: denies  Diet: 3 meals a day, eating healthier, reduced processed food intake, eating more salads and veggies Breakfast: sausage and eggs, keto bread and butter. Cranberry juice Lunch: Hamburger jimbo or sandwich, tuna fish or turkey Dinner: meat, occasional potatoes and pasta, Friday's will have pizza Snack: sugar free cookies, sugar free jello with sugar Drinks diet soda and water  Exercise: walks daily, not as much recently due to recent urological concerns Steroid intake: no PO steroids, daily steroid inhalers for COPD  Family history of diabetes: brother and maternal uncle with diabetes Social history: retired, lives with wife   Chronic blurry vision from cataract and retinopathy, denies polydypsia Reports some polyuria, seeing urologist for bladder tumor   Comorbidities: HTN, HLD  HLD:  Atorvastatin 40mg daily Ezetimibe 10mg daily Following cardiologist Dr. Keith  HTN: Metoprolol 50mg daily Enalapril 5mg a day  PCP: Dr. Lombardi phone: [phone] fax: [phone] Cardiologist: Dr. Keith phone: [phone] fax: [phone]

## 2024-06-05 NOTE — PHYSICAL EXAM
[Alert] : alert [No Acute Distress] : no acute distress [Well Developed] : well developed [Normal Sclera/Conjunctiva] : normal sclera/conjunctiva [No Proptosis] : no proptosis [No Respiratory Distress] : no respiratory distress [No Accessory Muscle Use] : no accessory muscle use [Normal Rate and Effort] : normal respiratory rate and effort [Normal Gait] : normal gait [No Involuntary Movements] : no involuntary movements were seen [Oriented x3] : oriented to person, place, and time [Normal Affect] : the affect was normal [Supple] : the neck was supple [Thyroid Not Enlarged] : the thyroid was not enlarged [Normal S1, S2] : normal S1 and S2 [Normal Rate] : heart rate was normal [Regular Rhythm] : with a regular rhythm [Not Tender] : non-tender [Not Distended] : not distended [Soft] : abdomen soft [No Stigmata of Cushings Syndrome] : no stigmata of Cushings Syndrome [No Tremors] : no tremors [Swelling] : not swollen [Tenderness] : not tender [Erythema] : not erythematous [#1 Diminished] : number 1 was normal [#2 Diminished] : number 2 was normal [#3 Diminished] : number 3 was normal [de-identified] : b/l feet dry, + callus

## 2024-06-05 NOTE — ASSESSMENT
[FreeTextEntry1] : The patient is a 69 year old male being seen in the office today for evaluation of diabetes. Past medical hx of T2DM (HbA1c ~10 in 12/23), HTN, CAD s/p angioplasty and stents, COPD, 60 pack-year smoking hx (quit in 1995), and bladder tumor. Admitted to hospital in [place] 1/2024 for syncope, had angiogram performed, s/p balloon angioplasty in [place]. Now following cardiology here, Dr. Keith. Recently found to have bladder tumor, following urology (Dr. Salomon). s/p TURBT.   T2DM - A1c 8.8% 3/25/2024 - Current regimen: Metformin 1000mg BID and Januvia 100mg daily - Plan: reviewed BG log book ang HgA1c above goal. Discussed possibly switching Januvia to GLP1, patient would like to avoid injectables at this time. Would avoid SGLT2i at this time given ongoing urological concerns/workup.  Can discuss stopping Januvia and starting Rybelsus. If patient not agreeable to start GLP1 then would recommend to start Glimepiride 1mg once a day. Would need to monitor closely for hypoglycemia. Discussed risks/benefits of both options. Patient states he is going on vacation soon, he would like to think about his options and discuss this when he returns. Patient is hesitant to adjust his DM regimen. We discussed long term complications of diabetes. Call office with highs/lows in BG.  - Preventive:   Retinopathy screening: + b/l retinopathy, following optho regularly   Foot exam/podiatrist: recommend podiatry evaluation  - Counseling: We discussed diabetes foot care, long term complications of diabetes including but not limited to neuropathy, nephropathy, retinopathy and cardiovascular disease and the benefits of good glycemic control in preventing said complications. We discussed the risks and benefits of diabetes medications and/or insulin as relevant for today, prevention and management of hypoglycemia, importance of medication compliance and blood glucose monitoring. - Discussed diabetic diet, decreased intake of simple/processed sugars, increase intake of whole foods with protein and fiber. Increase physical activity as tolerated with cardiovascular exercise 150 min/per week consistently and resistance exercise three days per week.  - I discussed the importance of avoiding mild hypoglycemia (FS<70 mg/dl) and severe hypoglycemia (FS<55 mg/dl) with the patient. I also discussed hypoglycemia correction with 4 oz of juice or 4 glucose tablets and rechecking FS in 15 minutes. If FS is still low, repeat 4oz juice or 4 glucose tablets and consume 12 grams of carbohydrates.  HLD:  - Atorvastatin 40mg daily - Ezetimibe 10mg daily - LDL at goal 3/2024 - Following cardiologist Dr. Keith     HTN: - Metoprolol 50mg daily - Enalapril 5mg a day - 3/2024 Urine ACR wnl - Recommend follow up with cardiology   Recommend CDE evaluation Has appointment with Dr. Graham in July

## 2024-06-12 ENCOUNTER — APPOINTMENT (OUTPATIENT)
Dept: UROLOGY | Facility: CLINIC | Age: 69
End: 2024-06-12
Payer: MEDICARE

## 2024-06-12 DIAGNOSIS — R33.9 RETENTION OF URINE, UNSPECIFIED: ICD-10-CM

## 2024-06-12 PROCEDURE — 99212 OFFICE O/P EST SF 10 MIN: CPT

## 2024-06-12 PROCEDURE — G2211 COMPLEX E/M VISIT ADD ON: CPT

## 2024-06-13 LAB
APPEARANCE: ABNORMAL
BACTERIA: ABNORMAL /HPF
BILIRUBIN URINE: NEGATIVE
BLOOD URINE: ABNORMAL
CAST: 0 /LPF
COLOR: YELLOW
EPITHELIAL CELLS: 0 /HPF
GLUCOSE QUALITATIVE U: >=1000 MG/DL
KETONES URINE: NEGATIVE MG/DL
LEUKOCYTE ESTERASE URINE: ABNORMAL
MICROSCOPIC-UA: NORMAL
NITRITE URINE: POSITIVE
PH URINE: 6.5
PROTEIN URINE: NORMAL MG/DL
RED BLOOD CELLS URINE: 1 /HPF
SPECIFIC GRAVITY URINE: 1.02
UROBILINOGEN URINE: 0.2 MG/DL
WHITE BLOOD CELLS URINE: 646 /HPF

## 2024-06-15 PROBLEM — R33.9 URINARY RETENTION: Status: ACTIVE | Noted: 2024-06-12

## 2024-06-15 NOTE — HISTORY OF PRESENT ILLNESS
[Urinary Frequency] : urinary frequency [Nocturia] : nocturia [Hematuria - Gross] : gross hematuria [None] : None [FreeTextEntry1] : Pt is a 67 y/o M with PMH of T2DM (HbA1c ~10 in 12/23), COPD, CAD s/p angioplasty and stents, 60 pack-year smoking hx (quit in 1995), presenting for evaluation of bladder tumor.  Pt was hospitalized in January while on vacation in [place] following an episode of syncope. Padilla catheter was placed in the hospital and subsequently had gross hematuria x4d.  US showed 1.2 cm vascularized polyp in the left bladder wall.  Denies hx of gross or microscopic hematuria prior to recent hospitalization. No active hematuria. Endorses urinary frequency and nocturia x2 every night.  no prior bladder tumors   Cystoscopy today (3/19/24): trabeculated bladder, left bladder wall tumor  called his cardiologist - at balloon angioplasty with drug eluting balloon - not approved in [place]. unclear when safe donna stop AC - 3 or 6 months   6/12/24 -Returns now for f/u to new [facility]IC s/p TURBT in March. Informs that he now performs CIC only x 1-2x daily with a volume of 300-400ml each tme. The rest of his urination is spontaneous. He consumes approx 100 oz of water daily but is c/o cloudy urine x 1 week. Denies dysuria or hematuria. Denies forcing, stream is good, no spraying. Prior to arrival, he spontaneously voided urinated 500ml of urine about 3 hours ago.   Pre-urination scan: 336 PVR: 276 PFR: 10.1 on a voided volume of 53ml   [Urinary Incontinence] : no urinary incontinence [Abdominal Pain] : no abdominal pain [Flank Pain] : no flank pain

## 2024-06-15 NOTE — ASSESSMENT
[FreeTextEntry1] : Discussed importance of increasing CIC to 3x daily until return in one month for cystoscopy

## 2024-06-16 DIAGNOSIS — N39.0 URINARY TRACT INFECTION, SITE NOT SPECIFIED: ICD-10-CM

## 2024-06-16 DIAGNOSIS — A49.9 URINARY TRACT INFECTION, SITE NOT SPECIFIED: ICD-10-CM

## 2024-06-16 LAB — BACTERIA UR CULT: ABNORMAL

## 2024-06-17 LAB — URINE CYTOLOGY: NORMAL

## 2024-06-17 RX ORDER — SULFAMETHOXAZOLE AND TRIMETHOPRIM 800; 160 MG/1; MG/1
800-160 TABLET ORAL TWICE DAILY
Qty: 14 | Refills: 0 | Status: ACTIVE | COMMUNITY
Start: 2024-06-16 | End: 1900-01-01

## 2024-06-20 ENCOUNTER — APPOINTMENT (OUTPATIENT)
Dept: ENDOCRINOLOGY | Facility: CLINIC | Age: 69
End: 2024-06-20
Payer: MEDICARE

## 2024-06-20 VITALS
BODY MASS INDEX: 22.4 KG/M2 | SYSTOLIC BLOOD PRESSURE: 110 MMHG | WEIGHT: 160 LBS | DIASTOLIC BLOOD PRESSURE: 60 MMHG | HEART RATE: 82 BPM | HEIGHT: 71 IN | OXYGEN SATURATION: 98 %

## 2024-06-20 LAB — HBA1C MFR BLD HPLC: 8.2

## 2024-06-20 PROCEDURE — G2211 COMPLEX E/M VISIT ADD ON: CPT

## 2024-06-20 PROCEDURE — 99204 OFFICE O/P NEW MOD 45 MIN: CPT

## 2024-06-20 PROCEDURE — 83036 HEMOGLOBIN GLYCOSYLATED A1C: CPT | Mod: QW

## 2024-06-20 RX ORDER — BLOOD SUGAR DIAGNOSTIC
STRIP MISCELLANEOUS
Qty: 2 | Refills: 1 | Status: ACTIVE | COMMUNITY
Start: 2024-03-25 | End: 1900-01-01

## 2024-06-24 NOTE — ASSESSMENT
[Diabetes Foot Care] : diabetes foot care [Long Term Vascular Complications] : long term vascular complications of diabetes [Carbohydrate Consistent Diet] : carbohydrate consistent diet [Importance of Diet and Exercise] : importance of diet and exercise to improve glycemic control, achieve weight loss and improve cardiovascular health [Exercise/Effect on Glucose] : exercise/effect on glucose [Self Monitoring of Blood Glucose] : self monitoring of blood glucose [Sick-Day Management] : sick-day management [Retinopathy Screening] : Patient was referred to ophthalmology for retinopathy screening [Diabetic Medications] : Risks and benefits of diabetic medications were discussed [FreeTextEntry1] : 69 year old male being seen in the office today for follow up for diabetes. Past medical hx of T2DM (HbA1c ~10 in 12/23), HTN, CAD s/p angioplasty and stents, COPD, 60 pack-year smoking hx (quit in 1995), and bladder tumor. Admitted to hospital in [place] 1/2024 for syncope, had angiogram performed, s/p balloon angioplasty in [place]. Now following cardiology here, Dr. Keith. Recently found to have bladder tumor, following urology (Dr. Salomon). S/p TURBT, now doing intermittent urinary catheterization, recent UTI, completed antibiotics.  T2DM - A1c 8.8% 3/25/2024 >> 8.2% in June 2024, improving but not yet at goal  - Goal < 7%  - Current regimen: Metformin 1000mg BID and Januvia 100mg daily - Plan: reviewed BG log book ang HgA1c above goal. Again discussed switching DPP4i/Januvia to GLP1 agonist, patient would like to avoid injectables at this time. BMI also 22, has been losing weight and patient/wife do not desire further weight loss at this time. Would avoid SGLT2i at this time given ongoing urological concerns/workup. Other options include glimepiride, actos, prandin but patient hesistant to make changes at this time & not ideal given cardiac hx (unclear if CHF; recent balloon angioplasty in [place] 2024) and given risk for hypoglycemia.  Discussed risks/benefits of these options as well as addition of basal insulin, again would like to avoid if possible as daily injection. Patient reports he feels there is room for improvement with diet/lifestyle & would like to trial these efforts first and think over medication adjustments/options discussed. Extensive discussion on carb consistent diet, regular physical activity as tolerated and importance of glycemic control. BG targets for fasting, premeal & 2 hours postprandial reviewed with patient. We discussed long term complications of diabetes. Call office with highs/lows in BG. To contact office sooner than next visit if BG levels persistently elevated.  - Preventive:   Retinopathy screening: + b/l retinopathy, following optho regularly   Foot exam/podiatrist: recommend podiatry evaluation  - Counseling: We discussed diabetes foot care, long term complications of diabetes including but not limited to neuropathy, nephropathy, retinopathy and cardiovascular disease and the benefits of good glycemic control in preventing said complications. We discussed the risks and benefits of diabetes medications and/or insulin as relevant for today, prevention and management of hypoglycemia, importance of medication compliance and blood glucose monitoring. - Discussed diabetic diet, decreased intake of simple/processed sugars, increase intake of whole foods with protein and fiber. Increase physical activity as tolerated with cardiovascular exercise 150 min/per week consistently and resistance exercise three days per week.  - I discussed the importance of avoiding mild hypoglycemia (FS<70 mg/dl) and severe hypoglycemia (FS<55 mg/dl) with the patient. I also discussed hypoglycemia correction with 4 oz of juice or 4 glucose tablets and rechecking FS in 15 minutes. If FS is still low, repeat 4oz juice or 4 glucose tablets and consume 12 grams of carbohydrates.  HLD:  - Atorvastatin 40mg daily - Ezetimibe 10mg daily - LDL at goal 3/2024 - Following cardiologist Dr. Keith     HTN: - Metoprolol 50mg daily - Enalapril 5mg a day - 3/2024 Urine ACR wnl - Recommend follow up with cardiology   RTC in 3-4 months, sooner PRN as above.

## 2024-06-24 NOTE — PHYSICAL EXAM
[Alert] : alert [No Acute Distress] : no acute distress [Well Developed] : well developed [Normal Sclera/Conjunctiva] : normal sclera/conjunctiva [No Proptosis] : no proptosis [Supple] : the neck was supple [Thyroid Not Enlarged] : the thyroid was not enlarged [No Respiratory Distress] : no respiratory distress [No Accessory Muscle Use] : no accessory muscle use [Normal Rate and Effort] : normal respiratory rate and effort [Normal S1, S2] : normal S1 and S2 [Normal Rate] : heart rate was normal [Regular Rhythm] : with a regular rhythm [Not Tender] : non-tender [Not Distended] : not distended [Soft] : abdomen soft [No Stigmata of Cushings Syndrome] : no stigmata of Cushings Syndrome [Normal Gait] : normal gait [No Involuntary Movements] : no involuntary movements were seen [No Tremors] : no tremors [Oriented x3] : oriented to person, place, and time [Normal Affect] : the affect was normal [#1 Diminished] : number 1 was normal [#2 Diminished] : number 2 was normal [#3 Diminished] : number 3 was normal [No Neck Mass] : no neck mass was observed [Swelling] : not swollen [Tenderness] : not tender [Erythema] : not erythematous [Normal Insight/Judgement] : insight and judgment were intact [Normal Mood] : the mood was normal [de-identified] : b/l feet dry, + callus

## 2024-06-24 NOTE — REVIEW OF SYSTEMS
[Blurred Vision] : blurred vision [As Noted in HPI] : as noted in HPI [Pain/Numbness of Digits] : pain/numbness of digits [Negative] : Constitutional [Eye Pain] : no pain [Dysphagia] : no dysphagia [Neck Pain] : no neck pain [Dysphonia] : no dysphonia [Chest Pain] : no chest pain [Palpitations] : no palpitations [Lower Ext Edema] : no lower extremity edema [Shortness Of Breath] : no shortness of breath [Nausea] : no nausea [Abdominal Pain] : no abdominal pain [Vomiting] : no vomiting [Diarrhea] : no diarrhea [Polyuria] : no polyuria [Dysuria] : no dysuria [Polydipsia] : no polydipsia [Cold Intolerance] : no cold intolerance [Heat Intolerance] : no heat intolerance

## 2024-06-24 NOTE — HISTORY OF PRESENT ILLNESS
[FreeTextEntry1] : 69 year old male being seen in the office today for follow up for diabetes.  Past medical hx of T2DM (HbA1c ~10 in ), HTN, CAD s/p angioplasty and stents, COPD, 60 pack-year smoking hx (quit in ), and bladder tumor. Admitted to hospital in [place] 2024 for syncope, had angiogram performed, s/p balloon angioplasty in [place]. Now following cardiology here, Dr. Keith. Recently found to have bladder tumor, following urology (Dr. Salomon). S/p TURBT, now doing intermittent urinary catheterization, recent UTI, completed antibiotics.   2024:  Last visit with DAWIT Lu in 2024.  T2DM dx in  A1c: reports HbA1c 10% in , 3/2024 8.8%  Current home regimen: Metformin 1000mg BID (increased at last visit) Januvia 100mg daily Past medications for diabetes: denies  Diabetes complications: Microvascular: [+] neuropathy, b/l feet with numbness, [-] nephropathy, [+] retinopathy Macrovascular: [+] CAD, [-] CVA, [-] PAD History of DKA/hospitalizations due to Diabetes: denies  Last retinopathy screening: last seen by optho 1 month ago, hx of retinopathy, s/p retina surgery for hemorrhage 2022, currently getting laser treatment for retinopathy in b/l eyes Last nephropathy screening: 3/2024 urine ACR wnl Last foot exam/podiatry visit: reports dry skin on b/l feet, does not see podiatry currently   BG self monitoring: checking fingersticks once daily AM fastin-205 2 hours after breakfast: 178-240 lunch: 332, 170, 217 2 hours after lunch: 170-115 2 hours after dinner: 201, 202 Hypoglycemia events: denies  Diet: 3 meals a day, eating healthier, reduced processed food intake, eating more salads and veggies Breakfast: sausage and eggs, keto bread and butter. Cranberry juice Lunch: Hamburger jimbo or sandwich, tuna fish or turkey Dinner: meat, occasional potatoes and pasta, Friday's will have pizza Snack: sugar free cookies, sugar free jello with sugar Drinks diet soda and water  Exercise: walks daily, not as much recently due to recent urological concerns Steroid intake: no PO steroids, daily steroid inhalers for COPD  Family history of diabetes: brother and maternal uncle with diabetes Social history: retired, lives with wife   Chronic blurry vision from cataract and retinopathy, denies polydipsia Following with urology, has urinary catheter that he uses on his own at home as still not completely voiding   Comorbidities: HTN, HLD  HLD:  Atorvastatin 40mg daily Ezetimibe 10mg daily Following cardiologist Dr. Keith  HTN: Metoprolol 50mg daily Enalapril 5mg a day  PCP: Dr. Lombardi phone: [phone] fax: [phone] Cardiologist: Dr. Keith phone: [phone] fax: [phone]

## 2024-07-09 ENCOUNTER — TRANSCRIPTION ENCOUNTER (OUTPATIENT)
Age: 69
End: 2024-07-09

## 2024-07-12 ENCOUNTER — APPOINTMENT (OUTPATIENT)
Dept: UROLOGY | Facility: CLINIC | Age: 69
End: 2024-07-12
Payer: MEDICARE

## 2024-07-12 ENCOUNTER — OUTPATIENT (OUTPATIENT)
Dept: OUTPATIENT SERVICES | Facility: HOSPITAL | Age: 69
LOS: 1 days | End: 2024-07-12
Payer: MEDICARE

## 2024-07-12 VITALS — HEART RATE: 68 BPM | DIASTOLIC BLOOD PRESSURE: 62 MMHG | SYSTOLIC BLOOD PRESSURE: 170 MMHG

## 2024-07-12 DIAGNOSIS — Z98.890 OTHER SPECIFIED POSTPROCEDURAL STATES: Chronic | ICD-10-CM

## 2024-07-12 DIAGNOSIS — R35.0 FREQUENCY OF MICTURITION: ICD-10-CM

## 2024-07-12 DIAGNOSIS — I25.10 ATHEROSCLEROTIC HEART DISEASE OF NATIVE CORONARY ARTERY WITHOUT ANGINA PECTORIS: Chronic | ICD-10-CM

## 2024-07-12 DIAGNOSIS — Z95.5 PRESENCE OF CORONARY ANGIOPLASTY IMPLANT AND GRAFT: Chronic | ICD-10-CM

## 2024-07-12 DIAGNOSIS — C67.2 MALIGNANT NEOPLASM OF LATERAL WALL OF BLADDER: ICD-10-CM

## 2024-07-12 PROCEDURE — 52000 CYSTOURETHROSCOPY: CPT

## 2024-07-13 PROBLEM — C67.2 MALIGNANT NEOPLASM OF LATERAL WALL OF URINARY BLADDER: Status: ACTIVE | Noted: 2024-07-13

## 2024-07-15 DIAGNOSIS — C67.2 MALIGNANT NEOPLASM OF LATERAL WALL OF BLADDER: ICD-10-CM

## 2024-07-16 LAB — URINE CYTOLOGY: NORMAL

## 2024-07-26 ENCOUNTER — NON-APPOINTMENT (OUTPATIENT)
Age: 69
End: 2024-07-26

## 2024-08-16 ENCOUNTER — APPOINTMENT (OUTPATIENT)
Dept: UROLOGY | Facility: CLINIC | Age: 69
End: 2024-08-16

## 2024-08-27 DIAGNOSIS — R82.90 UNSPECIFIED ABNORMAL FINDINGS IN URINE: ICD-10-CM

## 2024-08-31 LAB — BACTERIA UR CULT: ABNORMAL

## 2024-08-31 RX ORDER — SULFAMETHOXAZOLE AND TRIMETHOPRIM 800; 160 MG/1; MG/1
800-160 TABLET ORAL TWICE DAILY
Qty: 14 | Refills: 0 | Status: ACTIVE | COMMUNITY
Start: 2024-08-31 | End: 1900-01-01

## 2024-09-12 ENCOUNTER — APPOINTMENT (OUTPATIENT)
Dept: INTERNAL MEDICINE | Facility: CLINIC | Age: 69
End: 2024-09-12
Payer: COMMERCIAL

## 2024-09-12 VITALS
SYSTOLIC BLOOD PRESSURE: 130 MMHG | RESPIRATION RATE: 16 BRPM | OXYGEN SATURATION: 97 % | TEMPERATURE: 97.9 F | BODY MASS INDEX: 22.12 KG/M2 | HEART RATE: 77 BPM | DIASTOLIC BLOOD PRESSURE: 64 MMHG | HEIGHT: 71 IN | WEIGHT: 158 LBS

## 2024-09-12 DIAGNOSIS — C67.9 MALIGNANT NEOPLASM OF BLADDER, UNSPECIFIED: ICD-10-CM

## 2024-09-12 DIAGNOSIS — Z00.00 ENCOUNTER FOR GENERAL ADULT MEDICAL EXAMINATION W/OUT ABNORMAL FINDINGS: ICD-10-CM

## 2024-09-12 DIAGNOSIS — Z23 ENCOUNTER FOR IMMUNIZATION: ICD-10-CM

## 2024-09-12 DIAGNOSIS — E78.5 HYPERLIPIDEMIA, UNSPECIFIED: ICD-10-CM

## 2024-09-12 DIAGNOSIS — Z87.891 PERSONAL HISTORY OF NICOTINE DEPENDENCE: ICD-10-CM

## 2024-09-12 DIAGNOSIS — C44.90 UNSPECIFIED MALIGNANT NEOPLASM OF SKIN, UNSPECIFIED: ICD-10-CM

## 2024-09-12 DIAGNOSIS — E11.9 TYPE 2 DIABETES MELLITUS W/OUT COMPLICATIONS: ICD-10-CM

## 2024-09-12 DIAGNOSIS — I10 ESSENTIAL (PRIMARY) HYPERTENSION: ICD-10-CM

## 2024-09-12 DIAGNOSIS — D14.1 BENIGN NEOPLASM OF LARYNX: ICD-10-CM

## 2024-09-12 DIAGNOSIS — I25.10 ATHEROSCLEROTIC HEART DISEASE OF NATIVE CORONARY ARTERY W/OUT ANGINA PECTORIS: ICD-10-CM

## 2024-09-12 DIAGNOSIS — R06.09 OTHER FORMS OF DYSPNEA: ICD-10-CM

## 2024-09-12 DIAGNOSIS — R91.8 OTHER NONSPECIFIC ABNORMAL FINDING OF LUNG FIELD: ICD-10-CM

## 2024-09-12 PROCEDURE — 94727 GAS DIL/WSHOT DETER LNG VOL: CPT

## 2024-09-12 PROCEDURE — 99205 OFFICE O/P NEW HI 60 MIN: CPT | Mod: 25

## 2024-09-12 PROCEDURE — 94060 EVALUATION OF WHEEZING: CPT

## 2024-09-12 PROCEDURE — 94729 DIFFUSING CAPACITY: CPT

## 2024-09-12 PROCEDURE — 90677 PCV20 VACCINE IM: CPT

## 2024-09-12 PROCEDURE — ZZZZZ: CPT

## 2024-09-12 PROCEDURE — G0009: CPT

## 2024-09-12 RX ORDER — ALBUTEROL SULFATE 90 UG/1
108 (90 BASE) INHALANT RESPIRATORY (INHALATION)
Refills: 0 | Status: ACTIVE | COMMUNITY

## 2024-09-12 RX ORDER — FLUTICASONE PROPIONATE AND SALMETEROL 250; 50 UG/1; UG/1
250-50 POWDER RESPIRATORY (INHALATION)
Qty: 1 | Refills: 11 | Status: ACTIVE | COMMUNITY
Start: 2024-09-12

## 2024-09-12 RX ORDER — OXYCODONE AND ACETAMINOPHEN 10; 325 MG/1; MG/1
10-325 TABLET ORAL
Refills: 0 | Status: ACTIVE | COMMUNITY

## 2024-09-12 RX ORDER — METOPROLOL SUCCINATE 50 MG/1
50 TABLET, EXTENDED RELEASE ORAL
Refills: 0 | Status: ACTIVE | COMMUNITY

## 2024-09-12 RX ORDER — ASPIRIN 81 MG
81 TABLET, DELAYED RELEASE (ENTERIC COATED) ORAL
Refills: 0 | Status: ACTIVE | COMMUNITY

## 2024-09-12 RX ORDER — TIOTROPIUM BROMIDE 18 UG/1
18 CAPSULE ORAL; RESPIRATORY (INHALATION)
Refills: 0 | Status: ACTIVE | COMMUNITY

## 2024-09-12 RX ORDER — NICOTINE POLACRILEX 2 MG/1
2 GUM, CHEWING ORAL
Refills: 0 | Status: ACTIVE | COMMUNITY

## 2024-09-12 NOTE — REVIEW OF SYSTEMS
[Hoarseness] : hoarseness [Cough] : cough [Dyspnea on Exertion] : dyspnea on exertion [Negative] : Heme/Lymph

## 2024-09-15 NOTE — ASSESSMENT
[FreeTextEntry1] : Pt is a 67 y/o M with PMH of T2DM (HbA1c ~10 in 12/23), COPD, CAD s/p angioplasty and stents, 60 pack-year smoking hx (quit in 1995), presenting for evaluation of bladder tumor.   Plan: - UA today - Cystoscopy confirms left bladder wall tumor - F/u with cardiologist about holding Brilinta for TURBT: Rodri Keith  
minutes on the discharge service.

## 2024-09-16 ENCOUNTER — APPOINTMENT (OUTPATIENT)
Dept: ENDOCRINOLOGY | Facility: CLINIC | Age: 69
End: 2024-09-16

## 2024-09-18 ENCOUNTER — APPOINTMENT (OUTPATIENT)
Dept: INTERNAL MEDICINE | Facility: CLINIC | Age: 69
End: 2024-09-18
Payer: COMMERCIAL

## 2024-09-18 DIAGNOSIS — R09.02 HYPOXEMIA: ICD-10-CM

## 2024-09-18 DIAGNOSIS — J44.9 CHRONIC OBSTRUCTIVE PULMONARY DISEASE, UNSPECIFIED: ICD-10-CM

## 2024-09-18 PROCEDURE — 94618 PULMONARY STRESS TESTING: CPT

## 2024-09-19 PROBLEM — R09.02 EXERCISE HYPOXEMIA: Status: ACTIVE | Noted: 2024-09-19

## 2024-09-24 ENCOUNTER — NON-APPOINTMENT (OUTPATIENT)
Age: 69
End: 2024-09-24

## 2024-09-30 ENCOUNTER — APPOINTMENT (OUTPATIENT)
Dept: ENDOCRINOLOGY | Facility: CLINIC | Age: 69
End: 2024-09-30
Payer: MEDICARE

## 2024-09-30 VITALS
OXYGEN SATURATION: 98 % | HEIGHT: 71 IN | WEIGHT: 162 LBS | HEART RATE: 66 BPM | BODY MASS INDEX: 22.68 KG/M2 | DIASTOLIC BLOOD PRESSURE: 60 MMHG | SYSTOLIC BLOOD PRESSURE: 120 MMHG

## 2024-09-30 DIAGNOSIS — E11.9 TYPE 2 DIABETES MELLITUS W/OUT COMPLICATIONS: ICD-10-CM

## 2024-09-30 DIAGNOSIS — E78.5 HYPERLIPIDEMIA, UNSPECIFIED: ICD-10-CM

## 2024-09-30 DIAGNOSIS — I10 ESSENTIAL (PRIMARY) HYPERTENSION: ICD-10-CM

## 2024-09-30 LAB — HBA1C MFR BLD HPLC: 7.9

## 2024-09-30 PROCEDURE — 99214 OFFICE O/P EST MOD 30 MIN: CPT

## 2024-09-30 PROCEDURE — 83036 HEMOGLOBIN GLYCOSYLATED A1C: CPT | Mod: QW

## 2024-09-30 NOTE — ASSESSMENT
[Diabetes Foot Care] : diabetes foot care [Long Term Vascular Complications] : long term vascular complications of diabetes [Carbohydrate Consistent Diet] : carbohydrate consistent diet [Importance of Diet and Exercise] : importance of diet and exercise to improve glycemic control, achieve weight loss and improve cardiovascular health [Exercise/Effect on Glucose] : exercise/effect on glucose [Self Monitoring of Blood Glucose] : self monitoring of blood glucose [Sick-Day Management] : sick-day management [Retinopathy Screening] : Patient was referred to ophthalmology for retinopathy screening [Diabetic Medications] : Risks and benefits of diabetic medications were discussed [FreeTextEntry1] : 69 year old male being seen in the office today for follow up for diabetes. Past medical hx of T2DM (HbA1c ~10 in 12/23), HTN, CAD s/p angioplasty and stents, COPD, 60 pack-year smoking hx (quit in 1995), and bladder tumor. Admitted to hospital in [place] 1/2024 for syncope, had angiogram performed, s/p balloon angioplasty in [place]. Now following cardiology here, Dr. Keith. Recently found to have bladder cancer, following urology. S/p TURBT, now doing intermittent urinary catheterization.  T2DM - A1c 8.8% 3/25/2024 >> 8.2% in June 2024 >>> 7.9% today, improving but not yet at goal  - Goal < 7%  - Current regimen: Metformin 1000mg BID and Januvia 100mg daily - Plan: reviewed BG log book ang HgA1c above goal. Again discussed switching DPP4i/Januvia to GLP1 agonist, patient would like to avoid injectables at this time. BMI also 22, has been losing weight and patient/wife do not desire further weight loss at this time. Would avoid SGLT2i at this time given ongoing urological concerns/workup. Other options include glimepiride, actos, prandin. Unsure prandial BG, will hold off on Prandin for now, advised patient to check BG in staggered manner. Actos not ideal given cardiac hx (unclear if CHF; recent balloon angioplasty in [place] 2024). Discussed glimepiride with patient in great detail, discussed risks for hypoglycemia. Will check c-peptide and serum glucose, can consider starting glimepiride pending lab results. Extensive discussion on carb consistent diet, regular physical activity as tolerated and importance of glycemic control. BG targets for fasting, premeal & 2 hours postprandial reviewed with patient. We discussed long term complications of diabetes. Call office with highs/lows in BG. To contact office sooner than next visit if BG levels persistently elevated.  - Preventive:   Retinopathy screening: + b/l retinopathy, following optho regularly   Foot exam/podiatrist: recommend podiatry evaluation  - Counseling: We discussed diabetes foot care, long term complications of diabetes including but not limited to neuropathy, nephropathy, retinopathy and cardiovascular disease and the benefits of good glycemic control in preventing said complications. We discussed the risks and benefits of diabetes medications and/or insulin as relevant for today, prevention and management of hypoglycemia, importance of medication compliance and blood glucose monitoring. - Discussed diabetic diet, decreased intake of simple/processed sugars, increase intake of whole foods with protein and fiber. Increase physical activity as tolerated with cardiovascular exercise 150 min/per week consistently and resistance exercise three days per week.  - I discussed the importance of avoiding mild hypoglycemia (FS<70 mg/dl) and severe hypoglycemia (FS<55 mg/dl) with the patient. I also discussed hypoglycemia correction with 4 oz of juice or 4 glucose tablets and rechecking FS in 15 minutes. If FS is still low, repeat 4oz juice or 4 glucose tablets and consume 12 grams of carbohydrates.  HLD:  - Atorvastatin 40mg daily - Ezetimibe 10mg daily - LDL at goal 3/2024 - Following cardiologist Dr. Keith     HTN: - Metoprolol 50mg daily - Enalapril 5mg a day - 3/2024 Urine ACR wnl - Recommend follow up with cardiology   RTC in 3-4 months, sooner PRN as above.

## 2024-09-30 NOTE — PHYSICAL EXAM
[Alert] : alert [No Acute Distress] : no acute distress [Well Developed] : well developed [Normal Sclera/Conjunctiva] : normal sclera/conjunctiva [No Proptosis] : no proptosis [No Neck Mass] : no neck mass was observed [Supple] : the neck was supple [Thyroid Not Enlarged] : the thyroid was not enlarged [No Respiratory Distress] : no respiratory distress [No Accessory Muscle Use] : no accessory muscle use [Normal Rate and Effort] : normal respiratory rate and effort [Normal Rate] : heart rate was normal [Regular Rhythm] : with a regular rhythm [Not Tender] : non-tender [Not Distended] : not distended [Soft] : abdomen soft [No Stigmata of Cushings Syndrome] : no stigmata of Cushings Syndrome [Normal Gait] : normal gait [No Involuntary Movements] : no involuntary movements were seen [No Tremors] : no tremors [Oriented x3] : oriented to person, place, and time [Normal Affect] : the affect was normal [Normal Insight/Judgement] : insight and judgment were intact [Normal Mood] : the mood was normal [Swelling] : not swollen [Tenderness] : not tender [Erythema] : not erythematous [de-identified] : b/l feet dry, + callus

## 2024-09-30 NOTE — HISTORY OF PRESENT ILLNESS
[FreeTextEntry1] : 69 year old male being seen in the office today for follow up for diabetes.  Past medical hx of T2DM (HbA1c ~10 in ), HTN, CAD s/p angioplasty and stents, COPD, 60 pack-year smoking hx (quit in ), and bladder tumor. Admitted to hospital in [place] 2024 for syncope, had angiogram performed, s/p balloon angioplasty in [place]. Now following cardiology here, Dr. Keith. Recently found to have bladder cancer, following urology ([facility]). S/p TURBT, now doing intermittent urinary catheterization. Had UTI 2024, completed antibiotics.   Last visit with Dr. Graham 2024  T2DM dx in  A1c: reports HbA1c 10% in , 3/2024 8.8%, today 7.9%  Current home regimen: Metformin 1000mg BID Januvia 100mg daily Past medications for diabetes: denies  Diabetes complications: Microvascular: [+] neuropathy, b/l feet with numbness, [-] nephropathy, [+] retinopathy Macrovascular: [+] CAD, [-] CVA, [-] PAD History of DKA/hospitalizations due to Diabetes: denies  Last retinopathy screening: last seen by optho few months ago, hx of retinopathy, s/p retina surgery for hemorrhage 2022, has had laser treatment for retinopathy in b/l eyes, has upcoming appointment with retina specialist  Last nephropathy screening: 3/2024 urine ACR wnl Last foot exam/podiatry visit: reports dry skin on b/l feet, does not see podiatry currently   BG self monitoring: checking fingersticks 2-3 daily AM fastin, 146, 136, 181, 201, 184, 175, 190, 184, 133, 147, 173, 184 2 hours after dinner: 160, 187, 187, 215 Hypoglycemia events: denies  Diet: 3 meals a day, was eating healthier but recently has been eating more carbs since last visit  Breakfast: sausage and eggs, keto bread and butter. Cranberry juice Lunch: Hamburger jimbo or sandwich, tuna fish or turkey Dinner: meat, occasional potatoes and pasta, Friday's will have pizza Snack: sugar free cookies, sugar free jello with sugar Drinks diet soda and water  Exercise: not exercising currently, not as much recently due to recent urological concerns Steroid intake: not PO steroids, daily steroid inhalers for COPD  Family history of diabetes: brother and maternal uncle with diabetes Social history: retired, lives with wife   Chronic blurry vision from cataract and retinopathy, denies polydipsia Following with urology, has urinary catheter that he uses on his own at home as still not completely voiding   Comorbidities: HTN, HLD  HLD:  Atorvastatin 40mg daily Ezetimibe 10mg daily Following cardiologist Dr. Keith  HTN: Metoprolol 50mg daily Enalapril 5mg a day  PCP: Dr. Lombardi phone: [phone] fax: [phone] Cardiologist: Dr. Keith phone: [phone] fax: [phone]

## 2024-10-02 LAB
ALBUMIN SERPL ELPH-MCNC: 4.4 G/DL
ALP BLD-CCNC: 69 U/L
ALT SERPL-CCNC: 23 U/L
ANION GAP SERPL CALC-SCNC: 15 MMOL/L
AST SERPL-CCNC: 21 U/L
BILIRUB SERPL-MCNC: 0.7 MG/DL
BUN SERPL-MCNC: 16 MG/DL
C PEPTIDE SERPL-MCNC: 3.5 NG/ML
CALCIUM SERPL-MCNC: 9.7 MG/DL
CHLORIDE SERPL-SCNC: 103 MMOL/L
CO2 SERPL-SCNC: 24 MMOL/L
CREAT SERPL-MCNC: 0.97 MG/DL
EGFR: 83 ML/MIN/1.73M2
GLUCOSE SERPL-MCNC: 223 MG/DL
POTASSIUM SERPL-SCNC: 5.6 MMOL/L
PROT SERPL-MCNC: 6.7 G/DL
SODIUM SERPL-SCNC: 141 MMOL/L

## 2024-10-02 RX ORDER — GLIMEPIRIDE 1 MG/1
1 TABLET ORAL
Qty: 90 | Refills: 0 | Status: ACTIVE | COMMUNITY
Start: 2024-10-02 | End: 1900-01-01

## 2024-10-09 ENCOUNTER — NON-APPOINTMENT (OUTPATIENT)
Age: 69
End: 2024-10-09

## 2024-10-09 LAB
ALBUMIN SERPL ELPH-MCNC: 4.2 G/DL
ALP BLD-CCNC: 67 U/L
ALT SERPL-CCNC: 21 U/L
ANION GAP SERPL CALC-SCNC: 14 MMOL/L
AST SERPL-CCNC: 21 U/L
BILIRUB SERPL-MCNC: 0.8 MG/DL
BUN SERPL-MCNC: 17 MG/DL
CALCIUM SERPL-MCNC: 9.5 MG/DL
CHLORIDE SERPL-SCNC: 104 MMOL/L
CO2 SERPL-SCNC: 22 MMOL/L
CREAT SERPL-MCNC: 1 MG/DL
EGFR: 80 ML/MIN/1.73M2
GLUCOSE SERPL-MCNC: 231 MG/DL
POTASSIUM SERPL-SCNC: 4.9 MMOL/L
PROT SERPL-MCNC: 6.5 G/DL
SODIUM SERPL-SCNC: 140 MMOL/L

## 2024-10-15 ENCOUNTER — APPOINTMENT (OUTPATIENT)
Dept: UROLOGY | Facility: CLINIC | Age: 69
End: 2024-10-15

## 2024-10-23 ENCOUNTER — RX RENEWAL (OUTPATIENT)
Age: 69
End: 2024-10-23

## 2024-12-12 ENCOUNTER — APPOINTMENT (OUTPATIENT)
Dept: ENDOCRINOLOGY | Facility: CLINIC | Age: 69
End: 2024-12-12
Payer: MEDICARE

## 2024-12-12 VITALS
SYSTOLIC BLOOD PRESSURE: 148 MMHG | HEART RATE: 67 BPM | BODY MASS INDEX: 22.04 KG/M2 | DIASTOLIC BLOOD PRESSURE: 68 MMHG | OXYGEN SATURATION: 97 % | WEIGHT: 158 LBS

## 2024-12-12 DIAGNOSIS — E11.9 TYPE 2 DIABETES MELLITUS W/OUT COMPLICATIONS: ICD-10-CM

## 2024-12-12 DIAGNOSIS — I10 ESSENTIAL (PRIMARY) HYPERTENSION: ICD-10-CM

## 2024-12-12 DIAGNOSIS — E78.5 HYPERLIPIDEMIA, UNSPECIFIED: ICD-10-CM

## 2024-12-12 PROCEDURE — G2211 COMPLEX E/M VISIT ADD ON: CPT

## 2024-12-12 PROCEDURE — 99214 OFFICE O/P EST MOD 30 MIN: CPT

## 2025-01-07 ENCOUNTER — TRANSCRIPTION ENCOUNTER (OUTPATIENT)
Age: 70
End: 2025-01-07

## 2025-03-07 ENCOUNTER — LABORATORY RESULT (OUTPATIENT)
Age: 70
End: 2025-03-07

## 2025-03-18 ENCOUNTER — APPOINTMENT (OUTPATIENT)
Dept: INTERNAL MEDICINE | Facility: CLINIC | Age: 70
End: 2025-03-18
Payer: MEDICARE

## 2025-03-18 VITALS
SYSTOLIC BLOOD PRESSURE: 156 MMHG | HEART RATE: 80 BPM | WEIGHT: 159.25 LBS | HEIGHT: 71 IN | OXYGEN SATURATION: 96 % | BODY MASS INDEX: 22.29 KG/M2 | DIASTOLIC BLOOD PRESSURE: 60 MMHG | TEMPERATURE: 97.8 F

## 2025-03-18 DIAGNOSIS — Z00.00 ENCOUNTER FOR GENERAL ADULT MEDICAL EXAMINATION W/OUT ABNORMAL FINDINGS: ICD-10-CM

## 2025-03-18 DIAGNOSIS — Z87.891 PERSONAL HISTORY OF NICOTINE DEPENDENCE: ICD-10-CM

## 2025-03-18 DIAGNOSIS — N40.1 BENIGN PROSTATIC HYPERPLASIA WITH LOWER URINARY TRACT SYMPMS: ICD-10-CM

## 2025-03-18 PROCEDURE — G0439: CPT

## 2025-03-18 RX ORDER — FINASTERIDE 5 MG/1
5 TABLET, FILM COATED ORAL
Qty: 90 | Refills: 3 | Status: ACTIVE | COMMUNITY

## 2025-03-18 RX ORDER — TAMSULOSIN HYDROCHLORIDE 0.4 MG/1
0.4 CAPSULE ORAL
Refills: 0 | Status: ACTIVE | COMMUNITY

## 2025-05-22 ENCOUNTER — APPOINTMENT (OUTPATIENT)
Dept: ENDOCRINOLOGY | Facility: CLINIC | Age: 70
End: 2025-05-22
Payer: MEDICARE

## 2025-05-22 VITALS
OXYGEN SATURATION: 96 % | HEIGHT: 71 IN | WEIGHT: 161 LBS | DIASTOLIC BLOOD PRESSURE: 80 MMHG | BODY MASS INDEX: 22.54 KG/M2 | HEART RATE: 86 BPM | SYSTOLIC BLOOD PRESSURE: 148 MMHG

## 2025-05-22 DIAGNOSIS — E11.9 TYPE 2 DIABETES MELLITUS W/OUT COMPLICATIONS: ICD-10-CM

## 2025-05-22 DIAGNOSIS — Z00.00 ENCOUNTER FOR GENERAL ADULT MEDICAL EXAMINATION W/OUT ABNORMAL FINDINGS: ICD-10-CM

## 2025-05-22 DIAGNOSIS — E78.5 HYPERLIPIDEMIA, UNSPECIFIED: ICD-10-CM

## 2025-05-22 DIAGNOSIS — I10 ESSENTIAL (PRIMARY) HYPERTENSION: ICD-10-CM

## 2025-05-22 PROCEDURE — G2211 COMPLEX E/M VISIT ADD ON: CPT

## 2025-05-22 PROCEDURE — 99214 OFFICE O/P EST MOD 30 MIN: CPT

## 2025-05-23 LAB
CREAT SPEC-SCNC: 48 MG/DL
MICROALBUMIN 24H UR DL<=1MG/L-MCNC: <1.2 MG/DL
MICROALBUMIN/CREAT 24H UR-RTO: NORMAL MG/G

## 2025-05-28 ENCOUNTER — RX RENEWAL (OUTPATIENT)
Age: 70
End: 2025-05-28

## 2025-06-17 ENCOUNTER — RX RENEWAL (OUTPATIENT)
Age: 70
End: 2025-06-17

## 2025-07-11 ENCOUNTER — NON-APPOINTMENT (OUTPATIENT)
Age: 70
End: 2025-07-11

## 2025-09-15 ENCOUNTER — RX RENEWAL (OUTPATIENT)
Age: 70
End: 2025-09-15

## (undated) DEVICE — SOL IRR BAG H2O 3000ML

## (undated) DEVICE — TUBING THERMADX UROLOGY

## (undated) DEVICE — POSITIONER STRAP ARMBOARD VELCRO TS-30

## (undated) DEVICE — BAR ROLLER FOR ELITE ELCTR

## (undated) DEVICE — BAG URINE W METER 2L

## (undated) DEVICE — CABLE DAC ACTIVE CORD

## (undated) DEVICE — SYR CATH TIP 2 OZ

## (undated) DEVICE — TUBING LEVEL ONE NORMOFLO SET

## (undated) DEVICE — SOL IRR POUR H2O 1500ML

## (undated) DEVICE — CANISTER DISPOSABLE THIN WALL 3000CC

## (undated) DEVICE — PACK CYSTO

## (undated) DEVICE — ELCTR HF RESECTION LOOP 24FR 30 DEG 0.35 WIRE

## (undated) DEVICE — ELCTR GROUNDING PAD ADULT COVIDIEN

## (undated) DEVICE — LIJ/LIA-ADAPTER LCKNG ELLIK EVACUATING: Type: DURABLE MEDICAL EQUIPMENT

## (undated) DEVICE — WARMING BLANKET UPPER ADULT

## (undated) DEVICE — TUBING SET TUR BLADDER IRRIGATION Y-TYPE 81"

## (undated) DEVICE — VENODYNE/SCD SLEEVE CALF MEDIUM

## (undated) DEVICE — FOLEY CATH 2-WAY 18FR 5CC SILASTIC